# Patient Record
Sex: FEMALE | Race: WHITE
[De-identification: names, ages, dates, MRNs, and addresses within clinical notes are randomized per-mention and may not be internally consistent; named-entity substitution may affect disease eponyms.]

---

## 2019-02-06 ENCOUNTER — HOSPITAL ENCOUNTER (EMERGENCY)
Dept: HOSPITAL 91 - E/R | Age: 64
LOS: 1 days | Discharge: HOME | End: 2019-02-07
Payer: MEDICARE

## 2019-02-06 DIAGNOSIS — E11.9: ICD-10-CM

## 2019-02-06 DIAGNOSIS — Z79.84: ICD-10-CM

## 2019-02-06 DIAGNOSIS — I10: ICD-10-CM

## 2019-02-06 DIAGNOSIS — R10.9: Primary | ICD-10-CM

## 2019-02-06 DIAGNOSIS — F17.210: ICD-10-CM

## 2019-02-06 LAB
ADD MAN DIFF?: NO
ALANINE AMINOTRANSFERASE: 18 IU/L (ref 13–69)
ALBUMIN/GLOBULIN RATIO: 1.55
ALBUMIN: 4.5 G/DL (ref 3.3–4.9)
ALKALINE PHOSPHATASE: 70 IU/L (ref 42–121)
ANION GAP: 11 (ref 5–13)
ASPARTATE AMINO TRANSFERASE: 20 IU/L (ref 15–46)
BASOPHIL #: 0.1 10^3/UL (ref 0–0.1)
BASOPHILS %: 0.8 % (ref 0–2)
BILIRUBIN,DIRECT: 0 MG/DL (ref 0–0.2)
BILIRUBIN,TOTAL: 0.3 MG/DL (ref 0.2–1.3)
BLOOD UREA NITROGEN: 29 MG/DL (ref 7–20)
CALCIUM: 10.6 MG/DL (ref 8.4–10.2)
CARBON DIOXIDE: 26 MMOL/L (ref 21–31)
CHLORIDE: 102 MMOL/L (ref 97–110)
CREATININE: 0.59 MG/DL (ref 0.44–1)
EOSINOPHILS #: 0.2 10^3/UL (ref 0–0.5)
EOSINOPHILS %: 1.6 % (ref 0–7)
GLOBULIN: 2.9 G/DL (ref 1.3–3.2)
GLUCOSE: 108 MG/DL (ref 70–220)
HEMATOCRIT: 45.9 % (ref 37–47)
HEMOGLOBIN: 15.7 G/DL (ref 12–16)
IMMATURE GRANS #M: 0.05 10^3/UL (ref 0–0.03)
IMMATURE GRANS % (M): 0.5 % (ref 0–0.43)
LIPASE: 35 U/L (ref 23–300)
LYMPHOCYTES #: 2.6 10^3/UL (ref 0.8–2.9)
LYMPHOCYTES %: 27.4 % (ref 15–51)
MEAN CORPUSCULAR HEMOGLOBIN: 32.2 PG (ref 29–33)
MEAN CORPUSCULAR HGB CONC: 34.2 G/DL (ref 32–37)
MEAN CORPUSCULAR VOLUME: 94.3 FL (ref 82–101)
MEAN PLATELET VOLUME: 10.7 FL (ref 7.4–10.4)
MONOCYTE #: 0.8 10^3/UL (ref 0.3–0.9)
MONOCYTES %: 8.4 % (ref 0–11)
NEUTROPHIL #: 5.8 10^3/UL (ref 1.6–7.5)
NEUTROPHILS %: 61.3 % (ref 39–77)
NUCLEATED RED BLOOD CELLS #: 0 10^3/UL (ref 0–0)
NUCLEATED RED BLOOD CELLS%: 0 /100WBC (ref 0–0)
PLATELET COUNT: 203 10^3/UL (ref 140–415)
POTASSIUM: 3.7 MMOL/L (ref 3.5–5.1)
RED BLOOD COUNT: 4.87 10^6/UL (ref 4.2–5.4)
RED CELL DISTRIBUTION WIDTH: 12.3 % (ref 11.5–14.5)
SODIUM: 139 MMOL/L (ref 135–144)
TOTAL PROTEIN: 7.4 G/DL (ref 6.1–8.1)
WHITE BLOOD COUNT: 9.5 10^3/UL (ref 4.8–10.8)

## 2019-02-06 PROCEDURE — 96372 THER/PROPH/DIAG INJ SC/IM: CPT

## 2019-02-06 PROCEDURE — 99285 EMERGENCY DEPT VISIT HI MDM: CPT

## 2019-02-06 PROCEDURE — 83690 ASSAY OF LIPASE: CPT

## 2019-02-06 PROCEDURE — 36415 COLL VENOUS BLD VENIPUNCTURE: CPT

## 2019-02-06 PROCEDURE — 85025 COMPLETE CBC W/AUTO DIFF WBC: CPT

## 2019-02-06 PROCEDURE — 81001 URINALYSIS AUTO W/SCOPE: CPT

## 2019-02-06 PROCEDURE — 80053 COMPREHEN METABOLIC PANEL: CPT

## 2019-02-06 PROCEDURE — 74177 CT ABD & PELVIS W/CONTRAST: CPT

## 2019-02-06 RX ADMIN — IOHEXOL 1 ML: 300 INJECTION, SOLUTION INTRAVENOUS at 23:38

## 2019-02-06 RX ADMIN — VASOPRESSIN 1 ML/SEC: 20 INJECTION, SOLUTION INTRAMUSCULAR; SUBCUTANEOUS at 23:38

## 2019-02-07 LAB
ADD UMIC: YES
UR ASCORBIC ACID: NEGATIVE MG/DL
UR BACTERIA: (no result) /HPF
UR BILIRUBIN (DIP): NEGATIVE MG/DL
UR BLOOD (DIP): (no result) MG/DL
UR CLARITY: CLEAR
UR COLOR: YELLOW
UR GLUCOSE (DIP): NEGATIVE MG/DL
UR KETONES (DIP): NEGATIVE MG/DL
UR LEUKOCYTE ESTERASE (DIP): NEGATIVE LEU/UL
UR NITRITE (DIP): NEGATIVE MG/DL
UR PH (DIP): 5 (ref 5–9)
UR RBC: 6 /HPF (ref 0–5)
UR SPECIFIC GRAVITY (DIP): 1.01 (ref 1–1.03)
UR TOTAL PROTEIN (DIP): NEGATIVE MG/DL
UR UROBILINOGEN (DIP): NEGATIVE MG/DL
UR WBC: 8 /HPF (ref 0–5)

## 2019-02-07 RX ADMIN — METHYLNALTREXONE BROMIDE 1 MG: 12 INJECTION, SOLUTION SUBCUTANEOUS at 01:43

## 2019-07-12 ENCOUNTER — HOSPITAL ENCOUNTER (INPATIENT)
Dept: HOSPITAL 10 - FTE | Age: 64
LOS: 7 days | Discharge: HOME HEALTH | DRG: 202 | End: 2019-07-19
Payer: MEDICARE

## 2019-07-12 VITALS
BODY MASS INDEX: 32.77 KG/M2 | WEIGHT: 184.97 LBS | HEIGHT: 63 IN | HEIGHT: 63 IN | WEIGHT: 184.97 LBS | BODY MASS INDEX: 32.77 KG/M2

## 2019-07-12 VITALS — DIASTOLIC BLOOD PRESSURE: 72 MMHG | SYSTOLIC BLOOD PRESSURE: 148 MMHG | RESPIRATION RATE: 16 BRPM | HEART RATE: 97 BPM

## 2019-07-12 DIAGNOSIS — N32.81: ICD-10-CM

## 2019-07-12 DIAGNOSIS — F41.9: ICD-10-CM

## 2019-07-12 DIAGNOSIS — J44.0: ICD-10-CM

## 2019-07-12 DIAGNOSIS — K43.9: ICD-10-CM

## 2019-07-12 DIAGNOSIS — E66.9: ICD-10-CM

## 2019-07-12 DIAGNOSIS — F32.9: ICD-10-CM

## 2019-07-12 DIAGNOSIS — J96.11: ICD-10-CM

## 2019-07-12 DIAGNOSIS — Z79.84: ICD-10-CM

## 2019-07-12 DIAGNOSIS — E11.40: ICD-10-CM

## 2019-07-12 DIAGNOSIS — J20.9: Primary | ICD-10-CM

## 2019-07-12 DIAGNOSIS — G89.29: ICD-10-CM

## 2019-07-12 DIAGNOSIS — J44.1: ICD-10-CM

## 2019-07-12 DIAGNOSIS — M54.9: ICD-10-CM

## 2019-07-12 DIAGNOSIS — I10: ICD-10-CM

## 2019-07-12 DIAGNOSIS — K59.00: ICD-10-CM

## 2019-07-12 DIAGNOSIS — F17.210: ICD-10-CM

## 2019-07-12 DIAGNOSIS — F17.200: ICD-10-CM

## 2019-07-12 PROCEDURE — 36600 WITHDRAWAL OF ARTERIAL BLOOD: CPT

## 2019-07-12 PROCEDURE — 83605 ASSAY OF LACTIC ACID: CPT

## 2019-07-12 PROCEDURE — 82962 GLUCOSE BLOOD TEST: CPT

## 2019-07-12 PROCEDURE — 82803 BLOOD GASES ANY COMBINATION: CPT

## 2019-07-12 PROCEDURE — 80061 LIPID PANEL: CPT

## 2019-07-12 PROCEDURE — 85025 COMPLETE CBC W/AUTO DIFF WBC: CPT

## 2019-07-12 PROCEDURE — 84443 ASSAY THYROID STIM HORMONE: CPT

## 2019-07-12 PROCEDURE — 80053 COMPREHEN METABOLIC PANEL: CPT

## 2019-07-12 PROCEDURE — 71045 X-RAY EXAM CHEST 1 VIEW: CPT

## 2019-07-12 PROCEDURE — 84436 ASSAY OF TOTAL THYROXINE: CPT

## 2019-07-12 PROCEDURE — 94664 DEMO&/EVAL PT USE INHALER: CPT

## 2019-07-12 PROCEDURE — 84479 ASSAY OF THYROID (T3 OR T4): CPT

## 2019-07-12 PROCEDURE — 87086 URINE CULTURE/COLONY COUNT: CPT

## 2019-07-12 PROCEDURE — 84484 ASSAY OF TROPONIN QUANT: CPT

## 2019-07-12 PROCEDURE — 81003 URINALYSIS AUTO W/O SCOPE: CPT

## 2019-07-12 PROCEDURE — 83036 HEMOGLOBIN GLYCOSYLATED A1C: CPT

## 2019-07-12 PROCEDURE — 94640 AIRWAY INHALATION TREATMENT: CPT

## 2019-07-12 RX ADMIN — IPRATROPIUM BROMIDE AND ALBUTEROL SULFATE SCH ML: .5; 3 SOLUTION RESPIRATORY (INHALATION) at 21:52
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
## 2019-07-13 VITALS — RESPIRATION RATE: 18 BRPM | HEART RATE: 77 BPM | DIASTOLIC BLOOD PRESSURE: 78 MMHG | SYSTOLIC BLOOD PRESSURE: 129 MMHG

## 2019-07-13 VITALS — HEART RATE: 88 BPM | RESPIRATION RATE: 18 BRPM | DIASTOLIC BLOOD PRESSURE: 63 MMHG | SYSTOLIC BLOOD PRESSURE: 134 MMHG

## 2019-07-13 VITALS — RESPIRATION RATE: 18 BRPM | HEART RATE: 94 BPM | SYSTOLIC BLOOD PRESSURE: 130 MMHG | DIASTOLIC BLOOD PRESSURE: 63 MMHG

## 2019-07-13 VITALS — RESPIRATION RATE: 18 BRPM | DIASTOLIC BLOOD PRESSURE: 68 MMHG | HEART RATE: 77 BPM | SYSTOLIC BLOOD PRESSURE: 143 MMHG

## 2019-07-13 VITALS — RESPIRATION RATE: 18 BRPM | HEART RATE: 84 BPM | DIASTOLIC BLOOD PRESSURE: 60 MMHG | SYSTOLIC BLOOD PRESSURE: 129 MMHG

## 2019-07-13 RX ADMIN — IPRATROPIUM BROMIDE AND ALBUTEROL SULFATE SCH ML: .5; 3 SOLUTION RESPIRATORY (INHALATION) at 08:14

## 2019-07-13 RX ADMIN — DOCUSATE SODIUM SCH MG: 100 CAPSULE, LIQUID FILLED ORAL at 08:30

## 2019-07-13 RX ADMIN — LEVOFLOXACIN SCH MG: 750 TABLET, FILM COATED ORAL at 05:46

## 2019-07-13 RX ADMIN — OXYCODONE HYDROCHLORIDE SCH MG: 40 TABLET, FILM COATED, EXTENDED RELEASE ORAL at 08:30

## 2019-07-13 RX ADMIN — GABAPENTIN SCH MG: 300 CAPSULE ORAL at 21:39

## 2019-07-13 RX ADMIN — IPRATROPIUM BROMIDE AND ALBUTEROL SULFATE SCH ML: .5; 3 SOLUTION RESPIRATORY (INHALATION) at 05:00

## 2019-07-13 RX ADMIN — ISOSORBIDE DINITRATE SCH MG: 10 TABLET ORAL at 21:39

## 2019-07-13 RX ADMIN — INSULIN HUMAN SCH UNIT: 100 INJECTION, SUSPENSION SUBCUTANEOUS at 21:44

## 2019-07-13 RX ADMIN — PANTOPRAZOLE SODIUM SCH MG: 40 TABLET, DELAYED RELEASE ORAL at 05:46

## 2019-07-13 RX ADMIN — NICOTINE SCH PATCH: 14 PATCH, EXTENDED RELEASE TRANSDERMAL at 08:49

## 2019-07-13 RX ADMIN — OXYBUTYNIN CHLORIDE SCH MG: 5 TABLET, EXTENDED RELEASE ORAL at 08:29

## 2019-07-13 RX ADMIN — OXYCODONE HYDROCHLORIDE SCH MG: 40 TABLET, FILM COATED, EXTENDED RELEASE ORAL at 05:45

## 2019-07-13 RX ADMIN — ISOSORBIDE DINITRATE SCH MG: 10 TABLET ORAL at 08:31

## 2019-07-13 RX ADMIN — IPRATROPIUM BROMIDE AND ALBUTEROL SULFATE SCH ML: .5; 3 SOLUTION RESPIRATORY (INHALATION) at 11:58

## 2019-07-13 RX ADMIN — ISOSORBIDE DINITRATE SCH MG: 10 TABLET ORAL at 12:51

## 2019-07-13 RX ADMIN — BUPROPION HYDROCHLORIDE SCH MG: 150 TABLET, EXTENDED RELEASE ORAL at 08:30

## 2019-07-13 RX ADMIN — DOCUSATE SODIUM SCH MG: 100 CAPSULE, LIQUID FILLED ORAL at 21:39

## 2019-07-13 RX ADMIN — ZOLPIDEM TARTRATE PRN MG: 5 TABLET, FILM COATED ORAL at 00:25

## 2019-07-13 RX ADMIN — INSULIN HUMAN SCH UNIT: 100 INJECTION, SUSPENSION SUBCUTANEOUS at 16:26

## 2019-07-13 RX ADMIN — GABAPENTIN SCH MG: 300 CAPSULE ORAL at 12:47

## 2019-07-13 RX ADMIN — BUPROPION HYDROCHLORIDE SCH MG: 150 TABLET, EXTENDED RELEASE ORAL at 21:39

## 2019-07-13 RX ADMIN — CARISOPRODOL SCH MG: 350 TABLET ORAL at 08:30

## 2019-07-13 RX ADMIN — CARISOPRODOL SCH MG: 350 TABLET ORAL at 21:39

## 2019-07-13 RX ADMIN — DOCUSATE SODIUM SCH MG: 100 CAPSULE, LIQUID FILLED ORAL at 13:04

## 2019-07-13 RX ADMIN — ARFORMOTEROL TARTRATE SCH ML: 15 SOLUTION RESPIRATORY (INHALATION) at 19:49

## 2019-07-13 RX ADMIN — NICOTINE SCH PATCH: 14 PATCH, EXTENDED RELEASE TRANSDERMAL at 08:32

## 2019-07-13 RX ADMIN — MORPHINE SULFATE PRN MG: 2 INJECTION, SOLUTION INTRAMUSCULAR; INTRAVENOUS at 13:04

## 2019-07-13 RX ADMIN — ENOXAPARIN SODIUM SCH MG: 100 INJECTION SUBCUTANEOUS at 08:35

## 2019-07-13 RX ADMIN — IPRATROPIUM BROMIDE AND ALBUTEROL SULFATE SCH ML: .5; 3 SOLUTION RESPIRATORY (INHALATION) at 19:49

## 2019-07-13 RX ADMIN — GABAPENTIN SCH MG: 300 CAPSULE ORAL at 08:29

## 2019-07-13 RX ADMIN — ISOSORBIDE DINITRATE SCH MG: 10 TABLET ORAL at 12:47

## 2019-07-13 RX ADMIN — POLYETHYLENE GLYCOL 3350 SCH GM: 17 POWDER, FOR SOLUTION ORAL at 08:29

## 2019-07-13 RX ADMIN — BACLOFEN SCH MG: 10 TABLET ORAL at 08:29

## 2019-07-13 RX ADMIN — BUDESONIDE SCH MG: 0.5 SUSPENSION RESPIRATORY (INHALATION) at 19:49

## 2019-07-13 RX ADMIN — IPRATROPIUM BROMIDE AND ALBUTEROL SULFATE SCH ML: .5; 3 SOLUTION RESPIRATORY (INHALATION) at 16:32

## 2019-07-13 RX ADMIN — IPRATROPIUM BROMIDE AND ALBUTEROL SULFATE SCH ML: .5; 3 SOLUTION RESPIRATORY (INHALATION) at 01:05

## 2019-07-13 NOTE — PN
Date/Time of Note


Date/Time of Note


DATE: 7/13/19 


TIME: 12:26





Assessment/Plan


VTE Prophylaxis


Risk score (from Nsg)>0 risk:  3


SCD applied (from Nsg):  Yes


Pharmacological prophylaxis:  LMWH





Lines/Catheters


IV Catheter Type (from Nrsg):  Saline Lock





Assessment/Plan


Hospital Course


SUBJECTIVE: She still with wheezing.  Having mild shortness of breath.  On 3 L 


oxygen via nasal cannula.





OBJECTIVE: Vital signs-see below








PHYSICAL EXAM:


Constitutional: Adequately built,not in acute distress.


HEENT: Head atraumatic and normocephalic. Eyes: Extraocular muscles  intact. 


Anicteric sclerae. Pupils equal bilaterally, reactive to light.


NECK: Supple without lymph node.


CHEST: Expiratory wheezing bilaterally. 


HEART: S1, S2. Regular rate and rhythm.


ABDOMEN: Soft/non tender with no rebound tenderness.  Bowel sounds were present.


EXTREMITIES:  No cyanosis, clubbing or edema.


NEUROLOGIC: Alert and oriented x3. No focal deficit. No sensory deficit.


PSYCHOSOCIAL: No signs of depression.


INTEGUMENTARY: No open wounds.





ASSESSMENT AND PLAN:63 yo w/copd,current every day smoker,dm neuropathy,anxiety,


chronic back pain, here w/cough/sob...





COPD exacerbation


-Continue around-the-clock Annie, Brovana nebulization


-Increase Solu-Medrol to 40 mg q. 8


-Supplemental oxygen





Tracheobronchitis


-Continue Levaquin.  Add cough medications





DM2


-With IV steroids, will start patient on NPH to be linked with steroid dose.


-Continue basal/bolus regimen.





Anxiety disorders


-Continue home medications





Overactive bladder


-Continue home medications





Tobacco abuse


-Counseled on cessation.  Provide nicotine patch





Chronic Back pain


-pain control





Obesity with a BMI 32.8


-Weight reduction advised





DVT prophylaxis: Lovenox





Disposition: Continue current management.  Await for clinical improvement.  


Assess for home oxygen requirement.





Patient was seen in collaboration with Dr. Marquez.


Result Diagram:  


7/12/19 1800                                                                    


           7/12/19 1800





Results 24hrs





Laboratory Tests


Test
                 7/12/19
18:00  7/12/19
20:14  7/13/19
04:53  7/13/19
08:21


White Blood Count            10.4


Red Blood Count              4.79


Hemoglobin                   15.3


Hematocrit                   44.9


Mean Corpuscular             93.7


Volume


Mean Corpuscular             31.9


Hemoglobin


Mean Corpuscular            34.4  
  
              
              



Hemoglobin
Concent


Red Cell                     12.2


Distribution Width


Platelet Count               263  #


Mean Platelet Volume          9.1


Neutrophils %                67.4


Lymphocytes %                21.1


Monocytes %                   8.9


Eosinophils %                 0.5


Basophils %                   0.6


Nucleated Red Blood           0.0


Cells %


Neutrophils #                 7.0


Lymphocytes #                 2.2


Monocytes #                   0.9


Eosinophils #                 0.1


Basophils #                   0.1


Nucleated Red Blood           0.0


Cells #


Sodium Level                  140


Potassium Level              3.4  L


Chloride Level                100


Carbon Dioxide Level           30


Anion Gap                      10


Blood Urea Nitrogen            18


Creatinine                   0.87


Est Glomerular        > 60  
        
              
              



Filtrat Rate
mL/min


Glucose Level                 156


Calcium Level                 9.8


Total Bilirubin               0.4


Direct Bilirubin             0.00


Indirect Bilirubin            0.4


Aspartate Amino               28  
  
              
              



Transf
(AST/SGOT)


Alanine                       25  
  
              
              



Aminotransferase
(AL


T/SGPT)


Alkaline Phosphatase           86


Troponin I            < 0.012


Total Protein                 8.0


Albumin                       4.4


Globulin                    3.60  H


Albumin/Globulin             1.22


Ratio


POC Venous Lactate                           1.3


Hemoglobin A1c                                              5.4


Triglycerides Level                                          62


Cholesterol Level                                           141


LDL Cholesterol,                                             86


Calculated


HDL Cholesterol                                              43


Cholesterol/HDL                                             3.2


Ratio


Thyroid Stimulating   
              
                  0.119  L
  



Hormone
(TSH)


Free Thyroxine Index                                       3.51


Thyroxine (T4)                                              8.6


Triiodothyronine                                          40.8  H


(T3) Uptake


Bedside Glucose                                                            199








Exam/Review of Systems


Exam


Vitals





Vital Signs


  Date      Temp  Pulse  Resp  B/P (MAP)   Pulse Ox  O2          O2 Flow    FiO2


Time                                                 Delivery    Rate


   7/13/19           78    20                    95  Nasal             3.0


     11:59                                           Cannula


   7/13/19  98.3                   143/68


     08:16                           (93)


   7/12/19                                                                    21


     18:07








Results


Results 24hrs





Laboratory Tests


Test
                 7/12/19
18:00  7/12/19
20:14  7/13/19
04:53  7/13/19
08:21


White Blood Count            10.4


Red Blood Count              4.79


Hemoglobin                   15.3


Hematocrit                   44.9


Mean Corpuscular             93.7


Volume


Mean Corpuscular             31.9


Hemoglobin


Mean Corpuscular            34.4  
  
              
              



Hemoglobin
Concent


Red Cell                     12.2


Distribution Width


Platelet Count               263  #


Mean Platelet Volume          9.1


Neutrophils %                67.4


Lymphocytes %                21.1


Monocytes %                   8.9


Eosinophils %                 0.5


Basophils %                   0.6


Nucleated Red Blood           0.0


Cells %


Neutrophils #                 7.0


Lymphocytes #                 2.2


Monocytes #                   0.9


Eosinophils #                 0.1


Basophils #                   0.1


Nucleated Red Blood           0.0


Cells #


Sodium Level                  140


Potassium Level              3.4  L


Chloride Level                100


Carbon Dioxide Level           30


Anion Gap                      10


Blood Urea Nitrogen            18


Creatinine                   0.87


Est Glomerular        > 60  
        
              
              



Filtrat Rate
mL/min


Glucose Level                 156


Calcium Level                 9.8


Total Bilirubin               0.4


Direct Bilirubin             0.00


Indirect Bilirubin            0.4


Aspartate Amino               28  
  
              
              



Transf
(AST/SGOT)


Alanine                       25  
  
              
              



Aminotransferase
(AL


T/SGPT)


Alkaline Phosphatase           86


Troponin I            < 0.012


Total Protein                 8.0


Albumin                       4.4


Globulin                    3.60  H


Albumin/Globulin             1.22


Ratio


POC Venous Lactate                           1.3


Hemoglobin A1c                                              5.4


Triglycerides Level                                          62


Cholesterol Level                                           141


LDL Cholesterol,                                             86


Calculated


HDL Cholesterol                                              43


Cholesterol/HDL                                             3.2


Ratio


Thyroid Stimulating   
              
                  0.119  L
  



Hormone
(TSH)


Free Thyroxine Index                                       3.51


Thyroxine (T4)                                              8.6


Triiodothyronine                                          40.8  H


(T3) Uptake


Bedside Glucose                                                            199








Medications


Medication





Current Medications


Ondansetron HCl (Zofran Inj) 4 mg BRIDGE ORDER PRN IV NAUSEA/VOMITING;  Start 


7/12/19 at 20:00;  Stop 7/13/19 at 19:59


Acetaminophen (Tylenol Tab) 650 mg ER BRIDGE PRN PO .MILD PAIN 1-3 OR TEMP;  


Start 7/12/19 at 20:00;  Stop 7/13/19 at 19:59


IV Flush (NS 3 ml) 3 ml PER PROTOCOL IV ;  Start 7/12/19 at 20:30


Ondansetron HCl (Zofran Inj) 4 mg Q6H  PRN IV NAUSEA/VOMITING;  Start 7/12/19 at


20:30


Acetaminophen (Tylenol Tab) 650 mg Q6H  PRN PO .PAIN 1-3 OR TEMP Last 


administered on 7/13/19at 00:25; Admin Dose 650 MG;  Start 7/12/19 at 20:30


Docusate Sodium (Colace) 100 mg Q12H  PRN PO .CONSTIPATION;  Start 7/12/19 at 


20:30


Bisacodyl (Dulcolax) 5 mg DAILY  PRN PO .CONSTIPATION;  Start 7/12/19 at 20:30


Enoxaparin Sodium (Lovenox) 40 mg DAILY SC  Last administered on 7/13/19at 


08:35; Admin Dose 40 MG;  Start 7/13/19 at 09:00


Albuterol/ Ipratropium (Duoneb) 3 ml Q4H RESP  THERAPY HHN  Last administered on


7/13/19at 11:58; Admin Dose 3 ML;  Start 7/12/19 at 21:00


Methylprednisolone Sodium Succinate (Solu-Medrol) 30 mg Q8 IV  Last administered


on 7/13/19at 05:45; Admin Dose 30 MG;  Start 7/12/19 at 22:00


Levofloxacin (Levaquin) 750 mg DAILY@06 PO  Last administered on 7/13/19at 


05:46; Admin Dose 750 MG;  Start 7/13/19 at 06:00;  Stop 7/18/19 at 05:59


Gabapentin (Neurontin) 300 mg TID PO  Last administered on 7/13/19at 08:29; 


Admin Dose 300 MG;  Start 7/13/19 at 09:00


Nicotine (Nicoderm 14 Mg/ 24hr) 1 patch DAILY TRANSDERM  Last administered on 


7/13/19at 08:49; Admin Dose 1 PATCH;  Start 7/13/19 at 09:00


Albuterol (Proventil 0.083% (Neb)) 2.5 mg Q2H RESP THERAPY  PRN HHN SHORTNESS OF


BREATH;  Start 7/12/19 at 23:30


Diagnostic Test (Pha) (Accu-Chek) 1 ea 02 XX ;  Start 7/13/19 at 02:00


Insulin Aspart (Novolog Insulin Pen) NOVOLOG *MILD* ALGORITHM WITH MEALS  


BEDTIME SC  Last administered on 7/13/19at 08:48; Admin Dose 2 UNIT;  Start 


7/13/19 at 08:00


Miscellaneous Information 1 ea NOTE XX ;  Start 7/12/19 at 23:30


Glucose (Glutose) 15 gm Q15M  PRN PO DECREASED GLUCOSE;  Start 7/12/19 at 23:30


Glucose (Glutose) 22.5 gm Q15M  PRN PO DECREASED GLUCOSE;  Start 7/12/19 at 


23:30


Dextrose (D50w Syringe) 25 ml Q15M  PRN IV DECREASED GLUCOSE;  Start 7/12/19 at 


23:30


Dextrose (D50w Syringe) 50 ml Q15M  PRN IV DECREASED GLUCOSE;  Start 7/12/19 at 


23:30


Glucagon (Glucagen) 1 mg Q15M  PRN IM DECREASED GLUCOSE;  Start 7/12/19 at 23:30


Glucose (Glutose) 15 gm Q15M  PRN BUCCAL DECREASED GLUCOSE;  Start 7/12/19 at 


23:30


Zolpidem Tartrate (Ambien) 5 mg HS  PRN PO INSOMNIA Last administered on 


7/13/19at 00:25; Admin Dose 5 MG;  Start 7/12/19 at 23:30


Baclofen (Lioresal) 10 mg DAILY PO  Last administered on 7/13/19at 08:29; Admin 


Dose 10 MG;  Start 7/13/19 at 09:00


Bupropion HCl (Wellbutrin Sr) 150 mg BID PO  Last administered on 7/13/19at 


08:30; Admin Dose 150 MG;  Start 7/13/19 at 09:00


Carisoprodol (Soma) 350 mg Q12 PO  Last administered on 7/13/19at 08:30; Admin 


Dose 350 MG;  Start 7/13/19 at 09:00


Docusate Sodium (Colace) 100 mg TID PO  Last administered on 7/13/19at 08:30; 


Admin Dose 100 MG;  Start 7/13/19 at 09:00


Isosorbide Dinitrate (Isordil) 10 mg TID PO  Last administered on 7/13/19at 


08:31; Admin Dose 10 MG;  Start 7/13/19 at 09:00


Oxybutynin Chloride (Ditropan Xl) 10 mg DAILY PO  Last administered on 7/13/19at


08:29; Admin Dose 10 MG;  Start 7/13/19 at 09:00


Oxycodone HCl (Oxycontin) 40 mg Q8 PO  Last administered on 7/13/19at 08:30; 


Admin Dose 40 MG;  Start 7/13/19 at 06:00


Phenazopyridine HCl (Pyridium) 100 mg TID  PRN PO dysuria;  Start 7/13/19 at 


04:30


Polyethylene Glycol (Miralax) 17 gm DAILY PO  Last administered on 7/13/19at 


08:29; Admin Dose 17 GM;  Start 7/13/19 at 09:00


Tramadol HCl (Ultram) 100 mg Q6H  PRN PO PAIN LEVEL 1-3;  Start 7/13/19 at 04:30


Pantoprazole (Protonix Tab) 40 mg DAILY@06 PO  Last administered on 7/13/19at 


05:46; Admin Dose 40 MG;  Start 7/13/19 at 06:00


Arformoterol Tartrate (Brovana (Neb)) 2 ml BID RESP  THERAPY INH ;  Start 


7/13/19 at 20:00


Budesonide (Pulmicort (Neb)) 0.5 mg BID RESP  THERAPY INH ;  Start 7/13/19 at 


20:00











LETICIA MOFFETT NP               Jul 13, 2019 12:27

## 2019-07-14 VITALS — HEART RATE: 84 BPM | SYSTOLIC BLOOD PRESSURE: 131 MMHG | DIASTOLIC BLOOD PRESSURE: 64 MMHG | RESPIRATION RATE: 16 BRPM

## 2019-07-14 VITALS — DIASTOLIC BLOOD PRESSURE: 78 MMHG | RESPIRATION RATE: 18 BRPM | SYSTOLIC BLOOD PRESSURE: 103 MMHG | HEART RATE: 80 BPM

## 2019-07-14 VITALS — HEART RATE: 81 BPM | SYSTOLIC BLOOD PRESSURE: 129 MMHG | RESPIRATION RATE: 19 BRPM | DIASTOLIC BLOOD PRESSURE: 64 MMHG

## 2019-07-14 VITALS — SYSTOLIC BLOOD PRESSURE: 142 MMHG | RESPIRATION RATE: 20 BRPM | DIASTOLIC BLOOD PRESSURE: 66 MMHG | HEART RATE: 67 BPM

## 2019-07-14 RX ADMIN — GABAPENTIN SCH MG: 300 CAPSULE ORAL at 21:01

## 2019-07-14 RX ADMIN — IPRATROPIUM BROMIDE AND ALBUTEROL SULFATE SCH ML: .5; 3 SOLUTION RESPIRATORY (INHALATION) at 04:41

## 2019-07-14 RX ADMIN — PANTOPRAZOLE SODIUM SCH MG: 40 TABLET, DELAYED RELEASE ORAL at 05:52

## 2019-07-14 RX ADMIN — ISOSORBIDE DINITRATE SCH MG: 10 TABLET ORAL at 09:52

## 2019-07-14 RX ADMIN — POLYETHYLENE GLYCOL 3350 SCH GM: 17 POWDER, FOR SOLUTION ORAL at 09:51

## 2019-07-14 RX ADMIN — DOCUSATE SODIUM SCH MG: 100 CAPSULE, LIQUID FILLED ORAL at 21:04

## 2019-07-14 RX ADMIN — INSULIN HUMAN SCH UNIT: 100 INJECTION, SUSPENSION SUBCUTANEOUS at 14:16

## 2019-07-14 RX ADMIN — ISOSORBIDE DINITRATE SCH MG: 10 TABLET ORAL at 21:02

## 2019-07-14 RX ADMIN — BACLOFEN SCH MG: 10 TABLET ORAL at 09:51

## 2019-07-14 RX ADMIN — BUPROPION HYDROCHLORIDE SCH MG: 150 TABLET, EXTENDED RELEASE ORAL at 09:51

## 2019-07-14 RX ADMIN — CARISOPRODOL SCH MG: 350 TABLET ORAL at 09:51

## 2019-07-14 RX ADMIN — IPRATROPIUM BROMIDE AND ALBUTEROL SULFATE SCH ML: .5; 3 SOLUTION RESPIRATORY (INHALATION) at 10:08

## 2019-07-14 RX ADMIN — NICOTINE SCH PATCH: 14 PATCH, EXTENDED RELEASE TRANSDERMAL at 09:53

## 2019-07-14 RX ADMIN — ARFORMOTEROL TARTRATE SCH ML: 15 SOLUTION RESPIRATORY (INHALATION) at 07:47

## 2019-07-14 RX ADMIN — BUDESONIDE SCH MG: 0.5 SUSPENSION RESPIRATORY (INHALATION) at 07:55

## 2019-07-14 RX ADMIN — PHENAZOPYRIDINE HYDROCHLORIDE SCH MG: 100 TABLET ORAL at 14:17

## 2019-07-14 RX ADMIN — LEVOFLOXACIN SCH MG: 750 TABLET, FILM COATED ORAL at 05:52

## 2019-07-14 RX ADMIN — MORPHINE SULFATE PRN MG: 2 INJECTION, SOLUTION INTRAMUSCULAR; INTRAVENOUS at 11:11

## 2019-07-14 RX ADMIN — OXYBUTYNIN CHLORIDE SCH MG: 5 TABLET, EXTENDED RELEASE ORAL at 09:51

## 2019-07-14 RX ADMIN — GUAIFENESIN AND DEXTROMETHORPHAN HYDROBROMIDE SCH TAB: 600; 30 TABLET, EXTENDED RELEASE ORAL at 14:44

## 2019-07-14 RX ADMIN — GABAPENTIN SCH MG: 300 CAPSULE ORAL at 12:09

## 2019-07-14 RX ADMIN — AZITHROMYCIN SCH MG: 500 TABLET, FILM COATED ORAL at 14:44

## 2019-07-14 RX ADMIN — DOCUSATE SODIUM SCH MG: 100 CAPSULE, LIQUID FILLED ORAL at 09:51

## 2019-07-14 RX ADMIN — ZOLPIDEM TARTRATE PRN MG: 5 TABLET, FILM COATED ORAL at 23:50

## 2019-07-14 RX ADMIN — BUPROPION HYDROCHLORIDE SCH MG: 150 TABLET, EXTENDED RELEASE ORAL at 21:02

## 2019-07-14 RX ADMIN — AMOXICILLIN AND CLAVULANATE POTASSIUM SCH MG: 875; 125 TABLET, FILM COATED ORAL at 14:43

## 2019-07-14 RX ADMIN — MORPHINE SULFATE PRN MG: 2 INJECTION, SOLUTION INTRAMUSCULAR; INTRAVENOUS at 17:55

## 2019-07-14 RX ADMIN — ENOXAPARIN SODIUM SCH MG: 100 INJECTION SUBCUTANEOUS at 09:56

## 2019-07-14 RX ADMIN — CARISOPRODOL SCH MG: 350 TABLET ORAL at 21:01

## 2019-07-14 RX ADMIN — IPRATROPIUM BROMIDE AND ALBUTEROL SULFATE SCH ML: .5; 3 SOLUTION RESPIRATORY (INHALATION) at 14:02

## 2019-07-14 RX ADMIN — DOCUSATE SODIUM SCH MG: 100 CAPSULE, LIQUID FILLED ORAL at 12:09

## 2019-07-14 RX ADMIN — IPRATROPIUM BROMIDE AND ALBUTEROL SULFATE SCH ML: .5; 3 SOLUTION RESPIRATORY (INHALATION) at 17:24

## 2019-07-14 RX ADMIN — ISOSORBIDE DINITRATE SCH MG: 10 TABLET ORAL at 12:09

## 2019-07-14 RX ADMIN — GUAIFENESIN AND DEXTROMETHORPHAN HYDROBROMIDE SCH TAB: 600; 30 TABLET, EXTENDED RELEASE ORAL at 21:03

## 2019-07-14 RX ADMIN — BUDESONIDE SCH MG: 0.5 SUSPENSION RESPIRATORY (INHALATION) at 20:26

## 2019-07-14 RX ADMIN — IPRATROPIUM BROMIDE AND ALBUTEROL SULFATE SCH ML: .5; 3 SOLUTION RESPIRATORY (INHALATION) at 20:26

## 2019-07-14 RX ADMIN — AMOXICILLIN AND CLAVULANATE POTASSIUM SCH MG: 875; 125 TABLET, FILM COATED ORAL at 21:03

## 2019-07-14 RX ADMIN — INSULIN HUMAN SCH UNIT: 100 INJECTION, SUSPENSION SUBCUTANEOUS at 05:52

## 2019-07-14 RX ADMIN — PHENAZOPYRIDINE HYDROCHLORIDE SCH MG: 100 TABLET ORAL at 21:03

## 2019-07-14 RX ADMIN — INSULIN HUMAN SCH UNIT: 100 INJECTION, SUSPENSION SUBCUTANEOUS at 21:12

## 2019-07-14 RX ADMIN — ARFORMOTEROL TARTRATE SCH ML: 15 SOLUTION RESPIRATORY (INHALATION) at 20:26

## 2019-07-14 RX ADMIN — GABAPENTIN SCH MG: 300 CAPSULE ORAL at 09:50

## 2019-07-14 RX ADMIN — ALPRAZOLAM PRN MG: 0.25 TABLET ORAL at 14:17

## 2019-07-14 RX ADMIN — IPRATROPIUM BROMIDE AND ALBUTEROL SULFATE SCH ML: .5; 3 SOLUTION RESPIRATORY (INHALATION) at 00:42

## 2019-07-14 NOTE — PN
Date/Time of Note


Date/Time of Note


DATE: 7/14/19 


TIME: 12:09





Assessment/Plan


VTE Prophylaxis


Risk score (from Nsg)>0 risk:  3


SCD applied (from Nsg):  Yes


Pharmacological prophylaxis:  LMWH





Lines/Catheters


IV Catheter Type (from Nrsg):  Saline Lock





Assessment/Plan


Hospital Course


SUBJECTIVE: Today patient with worsening cough.  She is also wheezing 


bilaterally.  She also reports dysuria.  No fevers or chills.





OBJECTIVE: Vital signs-see below








PHYSICAL EXAM:


Constitutional: Adequately built,not in acute distress.


HEENT: Head atraumatic and normocephalic. Eyes: Extraocular muscles  intact. 


Anicteric sclerae. Pupils equal bilaterally, reactive to light.


NECK: Supple without lymph node.


CHEST: Expiratory wheezing bilaterally. 


HEART: S1, S2. Regular rate and rhythm.


ABDOMEN: Soft/non tender with no rebound tenderness.  Bowel sounds were present.


EXTREMITIES:  No cyanosis, clubbing or edema.


NEUROLOGIC: Alert and oriented x3. No focal deficit. No sensory deficit.


PSYCHOSOCIAL: No signs of depression.


INTEGUMENTARY: No open wounds.





ASSESSMENT AND PLAN:63 yo w/copd,current every day smoker,dm neuropathy,anxiety,


chronic back pain, here w/cough/sob...





COPD exacerbation


-Continue around-the-clock Annie, Brovana nebulization, IV steroids


-Supplemental oxygen





Acute tracheobronchitis


-Patient with worsening cough and symptoms.  


-Change antibiotic to Augmentin plus Zithromax.  DC Levaquin. 


-Cough medications.





DM2


-Stable glycemic trends.  Continue NPH linked with steroid dose.


-Continue basal/bolus regimen.





HTN


-Stable


-on Isordil





Anxiety disorders


-Continue home medications


-Add PRN Xanax and trazodone at nighttime as she could not sleep last night.





Overactive bladder


-Pyridium for dysuria.  Obtain UA and culture to rule out UTI.


-On Ditropan





Tobacco abuse


-Counseled on cessation.  Provide nicotine patch





Chronic Back pain


-pain control





Obesity with a BMI 32.8


-Weight reduction advised





DVT prophylaxis: Lovenox





Disposition: Continue current management.  Await for clinical improvement.  


Assess for home oxygen requirement.





Patient was seen in collaboration with Dr. Marquez.


Result Diagram:  


7/12/19 1800                                                                    


           7/12/19 1800





Results 24hrs





Laboratory Tests


  Test
            7/13/19
12:24  7/13/19
18:32  7/13/19
21:41  7/14/19
08:12


  Bedside Glucose         294  H          206            151            139








Exam/Review of Systems


Exam


Vitals





Vital Signs


  Date      Temp  Pulse  Resp  B/P (MAP)   Pulse Ox  O2          O2 Flow    FiO2


Time                                                 Delivery    Rate


   7/14/19           70    18                    93  Nasal             2.0


     10:15                                           Cannula


   7/14/19  98.2                   142/66


     07:53                           (91)


   7/14/19 21


     07:48








Intake and Output





7/13/19 7/13/19 7/14/19





1515:00


23:00


07:00





IntakeIntake Total


400 ml


1260 ml


120 ml





OutputOutput Total


350 ml


700 ml


1000 ml





BalanceBalance


50 ml


560 ml


-880 ml














Results


Results 24hrs





Laboratory Tests


  Test
            7/13/19
12:24  7/13/19
18:32  7/13/19
21:41  7/14/19
08:12


  Bedside Glucose         294  H          206            151            139








Medications


Medication





Current Medications


IV Flush (NS 3 ml) 3 ml PER PROTOCOL IV ;  Start 7/12/19 at 20:30


Ondansetron HCl (Zofran Inj) 4 mg Q6H  PRN IV NAUSEA/VOMITING;  Start 7/12/19 at


20:30


Acetaminophen (Tylenol Tab) 650 mg Q6H  PRN PO .PAIN 1-3 OR TEMP Last 


administered on 7/13/19at 00:25; Admin Dose 650 MG;  Start 7/12/19 at 20:30


Docusate Sodium (Colace) 100 mg Q12H  PRN PO .CONSTIPATION;  Start 7/12/19 at 


20:30


Bisacodyl (Dulcolax) 5 mg DAILY  PRN PO .CONSTIPATION;  Start 7/12/19 at 20:30


Enoxaparin Sodium (Lovenox) 40 mg DAILY SC  Last administered on 7/14/19at 


09:56; Admin Dose 40 MG;  Start 7/13/19 at 09:00


Albuterol/ Ipratropium (Duoneb) 3 ml Q4H RESP  THERAPY HHN  Last administered on


7/14/19at 10:08; Admin Dose 3 ML;  Start 7/12/19 at 21:00


Levofloxacin (Levaquin) 750 mg DAILY@06 PO  Last administered on 7/14/19at 


05:52; Admin Dose 750 MG;  Start 7/13/19 at 06:00;  Stop 7/18/19 at 05:59


Gabapentin (Neurontin) 300 mg TID PO  Last administered on 7/14/19at 09:50; 


Admin Dose 300 MG;  Start 7/13/19 at 09:00


Nicotine (Nicoderm 14 Mg/ 24hr) 1 patch DAILY TRANSDERM  Last administered on 


7/14/19at 09:53; Admin Dose 1 PATCH;  Start 7/13/19 at 09:00


Albuterol (Proventil 0.083% (Neb)) 2.5 mg Q2H RESP THERAPY  PRN HHN SHORTNESS OF


BREATH;  Start 7/12/19 at 23:30


Diagnostic Test (Pha) (Accu-Chek) 1 ea 02 XX ;  Start 7/13/19 at 02:00


Insulin Aspart (Novolog Insulin Pen) NOVOLOG *MILD* ALGORITHM WITH MEALS  


BEDTIME SC  Last administered on 7/13/19at 18:46; Admin Dose 2 UNIT;  Start 


7/13/19 at 08:00


Miscellaneous Information 1 ea NOTE XX ;  Start 7/12/19 at 23:30


Glucose (Glutose) 15 gm Q15M  PRN PO DECREASED GLUCOSE;  Start 7/12/19 at 23:30


Glucose (Glutose) 22.5 gm Q15M  PRN PO DECREASED GLUCOSE;  Start 7/12/19 at 


23:30


Dextrose (D50w Syringe) 25 ml Q15M  PRN IV DECREASED GLUCOSE;  Start 7/12/19 at 


23:30


Dextrose (D50w Syringe) 50 ml Q15M  PRN IV DECREASED GLUCOSE;  Start 7/12/19 at 


23:30


Glucagon (Glucagen) 1 mg Q15M  PRN IM DECREASED GLUCOSE;  Start 7/12/19 at 23:30


Glucose (Glutose) 15 gm Q15M  PRN BUCCAL DECREASED GLUCOSE;  Start 7/12/19 at 


23:30


Zolpidem Tartrate (Ambien) 5 mg HS  PRN PO INSOMNIA Last administered on 


7/13/19at 00:25; Admin Dose 5 MG;  Start 7/12/19 at 23:30


Baclofen (Lioresal) 10 mg DAILY PO  Last administered on 7/14/19at 09:51; Admin 


Dose 10 MG;  Start 7/13/19 at 09:00


Bupropion HCl (Wellbutrin Sr) 150 mg BID PO  Last administered on 7/14/19at 


09:51; Admin Dose 150 MG;  Start 7/13/19 at 09:00


Carisoprodol (Soma) 350 mg Q12 PO  Last administered on 7/14/19at 09:51; Admin 


Dose 350 MG;  Start 7/13/19 at 09:00


Docusate Sodium (Colace) 100 mg TID PO  Last administered on 7/14/19at 09:51; 


Admin Dose 100 MG;  Start 7/13/19 at 09:00


Isosorbide Dinitrate (Isordil) 10 mg TID PO  Last administered on 7/14/19at 


09:52; Admin Dose 10 MG;  Start 7/13/19 at 09:00


Oxybutynin Chloride (Ditropan Xl) 10 mg DAILY PO  Last administered on 7/14/19at


09:51; Admin Dose 10 MG;  Start 7/13/19 at 09:00


Phenazopyridine HCl (Pyridium) 100 mg TID  PRN PO dysuria;  Start 7/13/19 at 


04:30


Polyethylene Glycol (Miralax) 17 gm DAILY PO  Last administered on 7/14/19at 


09:51; Admin Dose 17 GM;  Start 7/13/19 at 09:00


Tramadol HCl (Ultram) 100 mg Q6H  PRN PO PAIN LEVEL 1-3 Last administered on 


7/14/19at 03:12; Admin Dose 100 MG;  Start 7/13/19 at 04:30


Pantoprazole (Protonix Tab) 40 mg DAILY@06 PO  Last administered on 7/14/19at 


05:52; Admin Dose 40 MG;  Start 7/13/19 at 06:00


Arformoterol Tartrate (Brovana (Neb)) 2 ml BID RESP  THERAPY INH  Last adm


inistered on 7/13/19at 19:49; Admin Dose 2 ML;  Start 7/13/19 at 20:00


Budesonide (Pulmicort (Neb)) 0.5 mg BID RESP  THERAPY INH  Last administered on 


7/14/19at 07:55; Admin Dose 0.5 MG;  Start 7/13/19 at 20:00


Methylprednisolone Sodium Succinate (Solu-Medrol) 40 mg Q8 IV  Last administered


on 7/14/19at 05:49; Admin Dose 40 MG;  Start 7/13/19 at 14:00


Morphine Sulfate (morphine) 2 mg Q4H  PRN IV SEVERE PAIN LEVEL 7-10 Last 


administered on 7/14/19at 11:11; Admin Dose 2 MG;  Start 7/13/19 at 12:30


Tiotropium Bromide (Spiriva) 1 inh DAILY INH  Last administered on 7/14/19at 


09:51; Admin Dose 1 INH;  Start 7/13/19 at 13:30


Insulin Human NPH (Humulin N) 10 unit Q8 SC  Last administered on 7/14/19at 


05:52; Admin Dose 10 UNIT;  Start 7/13/19 at 14:00


Guaifenesin/ Dextromethorphan (Robitussin Dm Liquid Cup) 10 ml Q4H  PRN PO cough


Last administered on 7/14/19at 03:11; Admin Dose 10 ML;  Start 7/13/19 at 12:30











LETICIA MOFFETT NP               Jul 14, 2019 12:14

## 2019-07-15 VITALS — SYSTOLIC BLOOD PRESSURE: 129 MMHG | HEART RATE: 71 BPM | RESPIRATION RATE: 18 BRPM | DIASTOLIC BLOOD PRESSURE: 63 MMHG

## 2019-07-15 VITALS — DIASTOLIC BLOOD PRESSURE: 68 MMHG | SYSTOLIC BLOOD PRESSURE: 144 MMHG | HEART RATE: 78 BPM | RESPIRATION RATE: 20 BRPM

## 2019-07-15 VITALS — HEART RATE: 53 BPM | SYSTOLIC BLOOD PRESSURE: 116 MMHG | RESPIRATION RATE: 20 BRPM | DIASTOLIC BLOOD PRESSURE: 56 MMHG

## 2019-07-15 VITALS — HEART RATE: 86 BPM | RESPIRATION RATE: 17 BRPM | SYSTOLIC BLOOD PRESSURE: 157 MMHG | DIASTOLIC BLOOD PRESSURE: 72 MMHG

## 2019-07-15 RX ADMIN — IPRATROPIUM BROMIDE AND ALBUTEROL SULFATE SCH ML: .5; 3 SOLUTION RESPIRATORY (INHALATION) at 20:01

## 2019-07-15 RX ADMIN — OXYBUTYNIN CHLORIDE SCH MG: 5 TABLET, EXTENDED RELEASE ORAL at 08:44

## 2019-07-15 RX ADMIN — IPRATROPIUM BROMIDE AND ALBUTEROL SULFATE SCH ML: .5; 3 SOLUTION RESPIRATORY (INHALATION) at 13:00

## 2019-07-15 RX ADMIN — MORPHINE SULFATE PRN MG: 2 INJECTION, SOLUTION INTRAMUSCULAR; INTRAVENOUS at 14:10

## 2019-07-15 RX ADMIN — PHENAZOPYRIDINE HYDROCHLORIDE SCH MG: 100 TABLET ORAL at 14:10

## 2019-07-15 RX ADMIN — BACLOFEN SCH MG: 10 TABLET ORAL at 08:44

## 2019-07-15 RX ADMIN — GABAPENTIN SCH MG: 300 CAPSULE ORAL at 08:43

## 2019-07-15 RX ADMIN — PANTOPRAZOLE SODIUM SCH MG: 40 TABLET, DELAYED RELEASE ORAL at 06:34

## 2019-07-15 RX ADMIN — MORPHINE SULFATE PRN MG: 2 INJECTION, SOLUTION INTRAMUSCULAR; INTRAVENOUS at 09:15

## 2019-07-15 RX ADMIN — ARFORMOTEROL TARTRATE SCH ML: 15 SOLUTION RESPIRATORY (INHALATION) at 20:00

## 2019-07-15 RX ADMIN — ISOSORBIDE DINITRATE SCH MG: 10 TABLET ORAL at 14:11

## 2019-07-15 RX ADMIN — DOCUSATE SODIUM SCH MG: 100 CAPSULE, LIQUID FILLED ORAL at 20:21

## 2019-07-15 RX ADMIN — INSULIN HUMAN SCH UNIT: 100 INJECTION, SUSPENSION SUBCUTANEOUS at 06:40

## 2019-07-15 RX ADMIN — CARISOPRODOL SCH MG: 350 TABLET ORAL at 08:43

## 2019-07-15 RX ADMIN — IPRATROPIUM BROMIDE AND ALBUTEROL SULFATE SCH ML: .5; 3 SOLUTION RESPIRATORY (INHALATION) at 04:28

## 2019-07-15 RX ADMIN — BUDESONIDE SCH MG: 0.5 SUSPENSION RESPIRATORY (INHALATION) at 08:38

## 2019-07-15 RX ADMIN — MORPHINE SULFATE PRN MG: 2 INJECTION, SOLUTION INTRAMUSCULAR; INTRAVENOUS at 20:21

## 2019-07-15 RX ADMIN — CARISOPRODOL SCH MG: 350 TABLET ORAL at 20:21

## 2019-07-15 RX ADMIN — POLYETHYLENE GLYCOL 3350 SCH GM: 17 POWDER, FOR SOLUTION ORAL at 08:41

## 2019-07-15 RX ADMIN — GUAIFENESIN AND DEXTROMETHORPHAN HYDROBROMIDE SCH TAB: 600; 30 TABLET, EXTENDED RELEASE ORAL at 08:44

## 2019-07-15 RX ADMIN — ISOSORBIDE DINITRATE SCH MG: 10 TABLET ORAL at 08:44

## 2019-07-15 RX ADMIN — AMOXICILLIN AND CLAVULANATE POTASSIUM SCH MG: 875; 125 TABLET, FILM COATED ORAL at 20:21

## 2019-07-15 RX ADMIN — INSULIN HUMAN SCH UNIT: 100 INJECTION, SUSPENSION SUBCUTANEOUS at 22:20

## 2019-07-15 RX ADMIN — AZITHROMYCIN SCH MG: 500 TABLET, FILM COATED ORAL at 08:43

## 2019-07-15 RX ADMIN — DOCUSATE SODIUM SCH MG: 100 CAPSULE, LIQUID FILLED ORAL at 11:46

## 2019-07-15 RX ADMIN — IPRATROPIUM BROMIDE AND ALBUTEROL SULFATE SCH ML: .5; 3 SOLUTION RESPIRATORY (INHALATION) at 08:38

## 2019-07-15 RX ADMIN — DOCUSATE SODIUM SCH MG: 100 CAPSULE, LIQUID FILLED ORAL at 08:43

## 2019-07-15 RX ADMIN — ISOSORBIDE DINITRATE SCH MG: 10 TABLET ORAL at 20:22

## 2019-07-15 RX ADMIN — NICOTINE SCH PATCH: 14 PATCH, EXTENDED RELEASE TRANSDERMAL at 08:43

## 2019-07-15 RX ADMIN — ZOLPIDEM TARTRATE PRN MG: 5 TABLET, FILM COATED ORAL at 22:10

## 2019-07-15 RX ADMIN — GUAIFENESIN AND DEXTROMETHORPHAN HYDROBROMIDE SCH TAB: 600; 30 TABLET, EXTENDED RELEASE ORAL at 20:21

## 2019-07-15 RX ADMIN — INSULIN HUMAN SCH UNIT: 100 INJECTION, SUSPENSION SUBCUTANEOUS at 14:19

## 2019-07-15 RX ADMIN — PHENAZOPYRIDINE HYDROCHLORIDE SCH MG: 100 TABLET ORAL at 20:21

## 2019-07-15 RX ADMIN — GABAPENTIN SCH MG: 300 CAPSULE ORAL at 14:10

## 2019-07-15 RX ADMIN — ENOXAPARIN SODIUM SCH MG: 100 INJECTION SUBCUTANEOUS at 08:50

## 2019-07-15 RX ADMIN — PHENAZOPYRIDINE HYDROCHLORIDE SCH MG: 100 TABLET ORAL at 08:43

## 2019-07-15 RX ADMIN — IPRATROPIUM BROMIDE AND ALBUTEROL SULFATE SCH ML: .5; 3 SOLUTION RESPIRATORY (INHALATION) at 00:22

## 2019-07-15 RX ADMIN — AMOXICILLIN AND CLAVULANATE POTASSIUM SCH MG: 875; 125 TABLET, FILM COATED ORAL at 08:44

## 2019-07-15 RX ADMIN — GABAPENTIN SCH MG: 300 CAPSULE ORAL at 20:23

## 2019-07-15 RX ADMIN — BUPROPION HYDROCHLORIDE SCH MG: 150 TABLET, EXTENDED RELEASE ORAL at 20:21

## 2019-07-15 RX ADMIN — ARFORMOTEROL TARTRATE SCH ML: 15 SOLUTION RESPIRATORY (INHALATION) at 08:39

## 2019-07-15 RX ADMIN — BUPROPION HYDROCHLORIDE SCH MG: 150 TABLET, EXTENDED RELEASE ORAL at 08:43

## 2019-07-15 RX ADMIN — IPRATROPIUM BROMIDE AND ALBUTEROL SULFATE SCH ML: .5; 3 SOLUTION RESPIRATORY (INHALATION) at 16:45

## 2019-07-15 RX ADMIN — BUDESONIDE SCH MG: 0.5 SUSPENSION RESPIRATORY (INHALATION) at 20:01

## 2019-07-15 RX ADMIN — SENNOSIDES SCH TAB: 8.6 TABLET, FILM COATED ORAL at 20:22

## 2019-07-15 NOTE — PN
Date/Time of Note


Date/Time of Note


DATE: 7/15/19 


TIME: 09:46





Assessment/Plan


VTE Prophylaxis


Risk score (from Nsg)>0 risk:  4


SCD applied (from Nsg):  Yes


Pharmacological prophylaxis:  LMWH





Lines/Catheters


IV Catheter Type (from Nrsg):  Saline Lock





Assessment/Plan


Hospital Course


SUBJECTIVE: Patient is doing well in terms of respiratory status today.  She is 


with improved work of breathing and improved cough status.


      Patient reports constipation and her last bowel movement was 4 days ago.  


She is also with mild abdominal distention.





OBJECTIVE: Vital signs-see below








PHYSICAL EXAM:


Constitutional: Adequately built,not in acute distress.


HEENT: Head atraumatic and normocephalic. Eyes: Extraocular muscles  intact. 


Anicteric sclerae. Pupils equal bilaterally, reactive to light.


NECK: Supple without lymph node.


CHEST: Expiratory wheezing bilaterally-improving. 


HEART: S1, S2. Regular rate and rhythm.


ABDOMEN: Slightly distended, no tenderness.  Bowel sounds were present.


EXTREMITIES:  No cyanosis, clubbing or edema.


NEUROLOGIC: Alert and oriented x3. No focal deficit. No sensory deficit.


PSYCHOSOCIAL: No signs of depression.


INTEGUMENTARY: No open wounds.





ASSESSMENT AND PLAN:65 yo w/copd,current every day smoker,dm neuropathy,anxiety,


chronic back pain, here w/cough/sob...





COPD exacerbation


-Improving


-Continue around-the-clock Annie, Brovana nebulization, IV steroids (start 


tapering in am)


-Supplemental oxygen





Acute tracheobronchitis


-Improving on Augmentin plus Zithromax.  


-Cough medications.





DM2


-Stable glycemic trends.  Continue NPH linked with steroid dose.


-Continue basal/bolus regimen.





HTN


-Stable


-on Isordil





Anxiety disorders


-Continue home medications


- PRN Xanax 





Overactive bladder


-Pyridium PRN for dysuria.  


-On Ditropan





Tobacco abuse


-Counseled on cessation.  Provide nicotine patch





Chronic Back pain


-pain control





Obesity with a BMI 32.8


-Weight reduction advised





Constipation


-Fleet enema x1


-Stool softeners/laxatives





DVT prophylaxis: Lovenox





Disposition: Continue current management.  Await for further clinical 


improvement.  Arrange home oxygen.  Anticipate discharge in 24 to 48 hours.





Patient was seen in collaboration with .


Result Diagram:  


7/12/19 1800                                                                    


           7/12/19 1800





Results 24hrs





Laboratory Tests


Test
              7/14/19
12:17     7/14/19
14:07  7/14/19
15:20  7/14/19
17:53


Bedside Glucose            140               154                           152


Urine Color                                         YELLOW


Urine Clarity                                       CLEAR


Urine pH                                                    7.0


Urine Specific                                            1.009


Gravity


Urine Ketones                                       NEGATIVE


Urine Nitrite                                       NEGATIVE


Urine Bilirubin                                     NEGATIVE


Urine                                               NEGATIVE


Urobilinogen


Urine Leukocyte                                     NEGATIVE


Esterase


Urine Hemoglobin                                    NEGATIVE


Urine Glucose                                       NEGATIVE


Urine Total                                         NEGATIVE


Protein


Test
              7/14/19
21:00     7/14/19
21:15  7/15/19
06:33  7/15/19
08:41


Bedside Glucose            143                              148            120


Blood Gas          
              Blood arterial
   
              



Specimen Source



Arterial Blood     
              7/14/2019
9:15:4  
              



Date Drawn
                                   8 PM


Arterial Blood pH  
                      7.410  
  
              



(Temp
corrected)


Arterial Blood     
                      50.0  H
  
              



pCO2


(Temp
correct)


Arterial Blood     
                     52.7  *L
  
              



pO2


(Temp
corrected)


Arterial Blood                             31.0  H


HCO3


Arterial Blood                              5.1  H


Base Excess


Arterial Blood     
                      87.7  L
  
              



Oxygen
Saturation


Lamin Test                        ACCEPTAB


Arterial Blood     
              Right Radial  
   
              



Gas Puncture
Site


Arterial           
                        0.8  
  
              



Blood
Carboxyhemo


globin


Arterial Blood                               0.2


Methemoglobin


Blood Gas A-a O2                           37.2  H


Differential


Oxyhemoglobin                              86.8  L


Percent


Blood Gas                                   37.0


Temperature


Blood Gas                         ROOM AIR


Modality


FiO2                                        21.0


Blood Gas          
              LUANNE TAVAREZ MD  
  
              



Critical Value


Read
Back


Blood Gas                         MA


Notified Whom


Blood Gas          
              7/14/2019
9:27:3  
              



Notified Time
                                7 PM








Exam/Review of Systems


Exam


Vitals





Vital Signs


  Date      Temp  Pulse  Resp  B/P (MAP)   Pulse Ox  O2          O2 Flow    FiO2


Time                                                 Delivery    Rate


   7/15/19           74    20                    94  Nasal             2.0


     08:39                                           Cannula


   7/15/19  97.4                   116/56


     07:24                           (76)


   7/14/19                                                                    21


     07:48








Intake and Output





7/14/19


7/14/19


7/15/19





1515:00


23:00


07:00





IntakeIntake Total


240 ml


840 ml


360 ml





OutputOutput Total


1200 ml


500 ml


1500 ml





BalanceBalance


-960 ml


340 ml


-1140 ml














Results


Results 24hrs





Laboratory Tests


Test
              7/14/19
12:17     7/14/19
14:07  7/14/19
15:20  7/14/19
17:53


Bedside Glucose            140               154                           152


Urine Color                                         YELLOW


Urine Clarity                                       CLEAR


Urine pH                                                    7.0


Urine Specific                                            1.009


Gravity


Urine Ketones                                       NEGATIVE


Urine Nitrite                                       NEGATIVE


Urine Bilirubin                                     NEGATIVE


Urine                                               NEGATIVE


Urobilinogen


Urine Leukocyte                                     NEGATIVE


Esterase


Urine Hemoglobin                                    NEGATIVE


Urine Glucose                                       NEGATIVE


Urine Total                                         NEGATIVE


Protein


Test
              7/14/19
21:00     7/14/19
21:15  7/15/19
06:33  7/15/19
08:41


Bedside Glucose            143                              148            120


Blood Gas          
              Blood arterial
   
              



Specimen Source



Arterial Blood     
              7/14/2019
9:15:4  
              



Date Drawn
                                   8 PM


Arterial Blood pH  
                      7.410  
  
              



(Temp
corrected)


Arterial Blood     
                      50.0  H
  
              



pCO2


(Temp
correct)


Arterial Blood     
                     52.7  *L
  
              



pO2


(Temp
corrected)


Arterial Blood                             31.0  H


HCO3


Arterial Blood                              5.1  H


Base Excess


Arterial Blood     
                      87.7  L
  
              



Oxygen
Saturation


Lamin Test                        ACCEPTAB


Arterial Blood     
              Right Radial  
   
              



Gas Puncture
Site


Arterial           
                        0.8  
  
              



Blood
Carboxyhemo


globin


Arterial Blood                               0.2


Methemoglobin


Blood Gas A-a O2                           37.2  H


Differential


Oxyhemoglobin                              86.8  L


Percent


Blood Gas                                   37.0


Temperature


Blood Gas                         ROOM AIR


Modality


FiO2                                        21.0


Blood Gas          
              LUANNE TAVAREZ MD  
  
              



Critical Value


Read
Back


Blood Gas                         MA


Notified Whom


Blood Gas          
              7/14/2019
9:27:3  
              



Notified Time
                                7 PM








Medications


Medication





Current Medications


IV Flush (NS 3 ml) 3 ml PER PROTOCOL IV ;  Start 7/12/19 at 20:30


Ondansetron HCl (Zofran Inj) 4 mg Q6H  PRN IV NAUSEA/VOMITING;  Start 7/12/19 at


20:30


Acetaminophen (Tylenol Tab) 650 mg Q6H  PRN PO .PAIN 1-3 OR TEMP Last 


administered on 7/13/19at 00:25; Admin Dose 650 MG;  Start 7/12/19 at 20:30


Docusate Sodium (Colace) 100 mg Q12H  PRN PO .CONSTIPATION;  Start 7/12/19 at 


20:30


Bisacodyl (Dulcolax) 5 mg DAILY  PRN PO .CONSTIPATION;  Start 7/12/19 at 20:30


Enoxaparin Sodium (Lovenox) 40 mg DAILY SC  Last administered on 7/15/19at 


08:50; Admin Dose 40 MG;  Start 7/13/19 at 09:00


Albuterol/ Ipratropium (Duoneb) 3 ml Q4H RESP  THERAPY HHN  Last administered on


7/15/19at 08:38; Admin Dose 3 ML;  Start 7/12/19 at 21:00


Gabapentin (Neurontin) 300 mg TID PO  Last administered on 7/15/19at 08:43; 


Admin Dose 300 MG;  Start 7/13/19 at 09:00


Nicotine (Nicoderm 14 Mg/ 24hr) 1 patch DAILY TRANSDERM  Last administered on 


7/15/19at 08:43; Admin Dose 1 PATCH;  Start 7/13/19 at 09:00


Albuterol (Proventil 0.083% (Neb)) 2.5 mg Q2H RESP THERAPY  PRN HHN SHORTNESS OF


BREATH;  Start 7/12/19 at 23:30


Diagnostic Test (Pha) (Accu-Chek) 1 ea 02 XX ;  Start 7/13/19 at 02:00


Insulin Aspart (Novolog Insulin Pen) NOVOLOG *MILD* ALGORITHM WITH MEALS  


BEDTIME SC  Last administered on 7/14/19at 17:56; Admin Dose 1 UNIT;  Start 


7/13/19 at 08:00


Miscellaneous Information 1 ea NOTE XX ;  Start 7/12/19 at 23:30


Glucose (Glutose) 15 gm Q15M  PRN PO DECREASED GLUCOSE;  Start 7/12/19 at 23:30


Glucose (Glutose) 22.5 gm Q15M  PRN PO DECREASED GLUCOSE;  Start 7/12/19 at 


23:30


Dextrose (D50w Syringe) 25 ml Q15M  PRN IV DECREASED GLUCOSE;  Start 7/12/19 at 


23:30


Dextrose (D50w Syringe) 50 ml Q15M  PRN IV DECREASED GLUCOSE;  Start 7/12/19 at 


23:30


Glucagon (Glucagen) 1 mg Q15M  PRN IM DECREASED GLUCOSE;  Start 7/12/19 at 23:30


Glucose (Glutose) 15 gm Q15M  PRN BUCCAL DECREASED GLUCOSE;  Start 7/12/19 at 


23:30


Zolpidem Tartrate (Ambien) 5 mg HS  PRN PO INSOMNIA Last administered on 


7/14/19at 23:50; Admin Dose 5 MG;  Start 7/12/19 at 23:30


Baclofen (Lioresal) 10 mg DAILY PO  Last administered on 7/15/19at 08:44; Admin 


Dose 10 MG;  Start 7/13/19 at 09:00


Bupropion HCl (Wellbutrin Sr) 150 mg BID PO  Last administered on 7/15/19at 


08:43; Admin Dose 150 MG;  Start 7/13/19 at 09:00


Carisoprodol (Soma) 350 mg Q12 PO  Last administered on 7/15/19at 08:43; Admin 


Dose 350 MG;  Start 7/13/19 at 09:00


Docusate Sodium (Colace) 100 mg TID PO  Last administered on 7/15/19at 08:43; 


Admin Dose 100 MG;  Start 7/13/19 at 09:00


Isosorbide Dinitrate (Isordil) 10 mg TID PO  Last administered on 7/15/19at 08


:44; Admin Dose 10 MG;  Start 7/13/19 at 09:00


Oxybutynin Chloride (Ditropan Xl) 10 mg DAILY PO  Last administered on 7/15/19at


08:44; Admin Dose 10 MG;  Start 7/13/19 at 09:00


Polyethylene Glycol (Miralax) 17 gm DAILY PO  Last administered on 7/15/19at 


08:41; Admin Dose 17 GM;  Start 7/13/19 at 09:00


Tramadol HCl (Ultram) 100 mg Q6H  PRN PO PAIN LEVEL 1-3 Last administered on 


7/15/19at 06:34; Admin Dose 100 MG;  Start 7/13/19 at 04:30


Pantoprazole (Protonix Tab) 40 mg DAILY@06 PO  Last administered on 7/15/19at 


06:34; Admin Dose 40 MG;  Start 7/13/19 at 06:00


Arformoterol Tartrate (Brovana (Neb)) 2 ml BID RESP  THERAPY INH  Last 


administered on 7/14/19at 20:26; Admin Dose 2 ML;  Start 7/13/19 at 20:00


Budesonide (Pulmicort (Neb)) 0.5 mg BID RESP  THERAPY INH  Last administered on 


7/15/19at 08:38; Admin Dose 0.5 MG;  Start 7/13/19 at 20:00


Methylprednisolone Sodium Succinate (Solu-Medrol) 40 mg Q8 IV  Last administered


on 7/15/19at 06:33; Admin Dose 40 MG;  Start 7/13/19 at 14:00


Morphine Sulfate (morphine) 2 mg Q4H  PRN IV SEVERE PAIN LEVEL 7-10 Last 


administered on 7/15/19at 09:15; Admin Dose 2 MG;  Start 7/13/19 at 12:30


Tiotropium Bromide (Spiriva) 1 inh DAILY INH  Last administered on 7/15/19at 


08:44; Admin Dose 1 INH;  Start 7/13/19 at 13:30


Insulin Human NPH (Humulin N) 10 unit Q8 SC  Last administered on 7/15/19at 


06:40; Admin Dose 10 UNIT;  Start 7/13/19 at 14:00


Guaifenesin/ Dextromethorphan (Robitussin Dm Liquid Cup) 10 ml Q4H  PRN PO cough


Last administered on 7/15/19at 09:15; Admin Dose 10 ML;  Start 7/13/19 at 12:30


Phenazopyridine HCl (Pyridium) 100 mg TID PO  Last administered on 7/15/19at 


08:43; Admin Dose 100 MG;  Start 7/14/19 at 13:00;  Stop 7/16/19 at 09:01


Azithromycin (Zithromax) 500 mg DAILY PO  Last administered on 7/15/19at 08:43; 


Admin Dose 500 MG;  Start 7/14/19 at 12:30;  Stop 7/19/19 at 12:29


Guaifenesin/ Dextromethorphan (Mucinex Dm) 1 tab BID PO  Last administered on 


7/15/19at 08:44; Admin Dose 1 TAB;  Start 7/14/19 at 13:00


Alprazolam (Xanax) 0.25 mg BID  PRN PO anxiety Last administered on 7/14/19at 


14:17; Admin Dose 0.25 MG;  Start 7/14/19 at 12:30


Trazodone HCl (Desyrel) 50 mg HS PO  Last administered on 7/14/19at 21:04; Admin


Dose 50 MG;  Start 7/14/19 at 21:00


Amoxicillin/ Clavulanate Potassium (Augmentin) 875 mg BID PO  Last administered 


on 7/15/19at 08:44; Admin Dose 875 MG;  Start 7/14/19 at 13:00











LETICIA MOFFETT NP               Jul 15, 2019 09:48

## 2019-07-16 VITALS — HEART RATE: 78 BPM | SYSTOLIC BLOOD PRESSURE: 146 MMHG | DIASTOLIC BLOOD PRESSURE: 65 MMHG | RESPIRATION RATE: 20 BRPM

## 2019-07-16 VITALS — SYSTOLIC BLOOD PRESSURE: 138 MMHG | DIASTOLIC BLOOD PRESSURE: 63 MMHG | HEART RATE: 69 BPM | RESPIRATION RATE: 20 BRPM

## 2019-07-16 VITALS — DIASTOLIC BLOOD PRESSURE: 58 MMHG | HEART RATE: 72 BPM | RESPIRATION RATE: 18 BRPM | SYSTOLIC BLOOD PRESSURE: 127 MMHG

## 2019-07-16 VITALS — RESPIRATION RATE: 18 BRPM | HEART RATE: 67 BPM | SYSTOLIC BLOOD PRESSURE: 159 MMHG | DIASTOLIC BLOOD PRESSURE: 69 MMHG

## 2019-07-16 RX ADMIN — DOCUSATE SODIUM SCH MG: 100 CAPSULE, LIQUID FILLED ORAL at 21:35

## 2019-07-16 RX ADMIN — IPRATROPIUM BROMIDE AND ALBUTEROL SULFATE SCH ML: .5; 3 SOLUTION RESPIRATORY (INHALATION) at 20:23

## 2019-07-16 RX ADMIN — BUPROPION HYDROCHLORIDE SCH MG: 150 TABLET, EXTENDED RELEASE ORAL at 09:07

## 2019-07-16 RX ADMIN — IPRATROPIUM BROMIDE AND ALBUTEROL SULFATE SCH ML: .5; 3 SOLUTION RESPIRATORY (INHALATION) at 14:30

## 2019-07-16 RX ADMIN — BUDESONIDE SCH MG: 0.5 SUSPENSION RESPIRATORY (INHALATION) at 08:34

## 2019-07-16 RX ADMIN — DOCUSATE SODIUM SCH MG: 100 CAPSULE, LIQUID FILLED ORAL at 09:06

## 2019-07-16 RX ADMIN — BACLOFEN SCH MG: 10 TABLET ORAL at 09:07

## 2019-07-16 RX ADMIN — ENOXAPARIN SODIUM SCH MG: 100 INJECTION SUBCUTANEOUS at 09:15

## 2019-07-16 RX ADMIN — GABAPENTIN SCH MG: 300 CAPSULE ORAL at 14:08

## 2019-07-16 RX ADMIN — IPRATROPIUM BROMIDE AND ALBUTEROL SULFATE SCH ML: .5; 3 SOLUTION RESPIRATORY (INHALATION) at 16:50

## 2019-07-16 RX ADMIN — CARISOPRODOL SCH MG: 350 TABLET ORAL at 21:34

## 2019-07-16 RX ADMIN — MORPHINE SULFATE PRN MG: 2 INJECTION, SOLUTION INTRAMUSCULAR; INTRAVENOUS at 11:14

## 2019-07-16 RX ADMIN — INSULIN HUMAN SCH UNIT: 100 INJECTION, SUSPENSION SUBCUTANEOUS at 21:46

## 2019-07-16 RX ADMIN — ISOSORBIDE DINITRATE SCH MG: 10 TABLET ORAL at 14:13

## 2019-07-16 RX ADMIN — ARFORMOTEROL TARTRATE SCH ML: 15 SOLUTION RESPIRATORY (INHALATION) at 08:33

## 2019-07-16 RX ADMIN — ISOSORBIDE DINITRATE SCH MG: 10 TABLET ORAL at 21:36

## 2019-07-16 RX ADMIN — IPRATROPIUM BROMIDE AND ALBUTEROL SULFATE SCH ML: .5; 3 SOLUTION RESPIRATORY (INHALATION) at 00:32

## 2019-07-16 RX ADMIN — ARFORMOTEROL TARTRATE SCH ML: 15 SOLUTION RESPIRATORY (INHALATION) at 20:00

## 2019-07-16 RX ADMIN — IPRATROPIUM BROMIDE AND ALBUTEROL SULFATE SCH ML: .5; 3 SOLUTION RESPIRATORY (INHALATION) at 04:46

## 2019-07-16 RX ADMIN — IPRATROPIUM BROMIDE AND ALBUTEROL SULFATE SCH ML: .5; 3 SOLUTION RESPIRATORY (INHALATION) at 08:16

## 2019-07-16 RX ADMIN — SENNOSIDES SCH TAB: 8.6 TABLET, FILM COATED ORAL at 21:36

## 2019-07-16 RX ADMIN — NICOTINE SCH PATCH: 14 PATCH, EXTENDED RELEASE TRANSDERMAL at 09:06

## 2019-07-16 RX ADMIN — PANTOPRAZOLE SODIUM SCH MG: 40 TABLET, DELAYED RELEASE ORAL at 05:47

## 2019-07-16 RX ADMIN — OXYBUTYNIN CHLORIDE SCH MG: 5 TABLET, EXTENDED RELEASE ORAL at 09:09

## 2019-07-16 RX ADMIN — IPRATROPIUM BROMIDE AND ALBUTEROL SULFATE SCH ML: .5; 3 SOLUTION RESPIRATORY (INHALATION) at 16:30

## 2019-07-16 RX ADMIN — BENZONATATE SCH MG: 100 CAPSULE ORAL at 14:08

## 2019-07-16 RX ADMIN — GABAPENTIN SCH MG: 300 CAPSULE ORAL at 21:37

## 2019-07-16 RX ADMIN — AMOXICILLIN AND CLAVULANATE POTASSIUM SCH MG: 875; 125 TABLET, FILM COATED ORAL at 09:07

## 2019-07-16 RX ADMIN — BUDESONIDE SCH MG: 0.5 SUSPENSION RESPIRATORY (INHALATION) at 20:23

## 2019-07-16 RX ADMIN — GUAIFENESIN AND DEXTROMETHORPHAN HYDROBROMIDE SCH TAB: 600; 30 TABLET, EXTENDED RELEASE ORAL at 09:08

## 2019-07-16 RX ADMIN — SENNOSIDES SCH TAB: 8.6 TABLET, FILM COATED ORAL at 09:07

## 2019-07-16 RX ADMIN — GUAIFENESIN AND DEXTROMETHORPHAN HYDROBROMIDE SCH TAB: 600; 30 TABLET, EXTENDED RELEASE ORAL at 21:38

## 2019-07-16 RX ADMIN — MORPHINE SULFATE PRN MG: 2 INJECTION, SOLUTION INTRAMUSCULAR; INTRAVENOUS at 20:08

## 2019-07-16 RX ADMIN — AMOXICILLIN AND CLAVULANATE POTASSIUM SCH MG: 875; 125 TABLET, FILM COATED ORAL at 21:36

## 2019-07-16 RX ADMIN — BUPROPION HYDROCHLORIDE SCH MG: 150 TABLET, EXTENDED RELEASE ORAL at 21:36

## 2019-07-16 RX ADMIN — INSULIN HUMAN SCH UNIT: 100 INJECTION, SUSPENSION SUBCUTANEOUS at 06:03

## 2019-07-16 RX ADMIN — PHENAZOPYRIDINE HYDROCHLORIDE SCH MG: 100 TABLET ORAL at 09:08

## 2019-07-16 RX ADMIN — BENZONATATE SCH MG: 100 CAPSULE ORAL at 21:35

## 2019-07-16 RX ADMIN — AZITHROMYCIN SCH MG: 500 TABLET, FILM COATED ORAL at 09:07

## 2019-07-16 RX ADMIN — ISOSORBIDE DINITRATE SCH MG: 10 TABLET ORAL at 09:07

## 2019-07-16 RX ADMIN — CARISOPRODOL SCH MG: 350 TABLET ORAL at 09:08

## 2019-07-16 RX ADMIN — GABAPENTIN SCH MG: 300 CAPSULE ORAL at 09:09

## 2019-07-16 NOTE — PN
Date/Time of Note


Date/Time of Note


DATE: 7/16/19 


TIME: 11:49





Assessment/Plan


VTE Prophylaxis


Risk score (from Nsg)>0 risk:  2


SCD applied (from Ns):  No


SCD contraindicated:  other


Pharmacological prophylaxis:  LMWH





Lines/Catheters


IV Catheter Type (from Nrs):  Peripheral IV


Urinary Cath still in place:  No





Assessment/Plan


Hospital Course


SUBJECTIVE: With improved respiratory status.  Patient has not had a bowel 


movement after enema yesterday.





OBJECTIVE: Vital signs-see below








PHYSICAL EXAM:


Constitutional: Adequately built,not in acute distress.


HEENT: Head atraumatic and normocephalic. Eyes: Extraocular muscles  intact. 


Anicteric sclerae. Pupils equal bilaterally, reactive to light.


NECK: Supple without lymph node.


CHEST: Expiratory wheezing bilaterally-improving. 


HEART: S1, S2. Regular rate and rhythm.


ABDOMEN:+Chronic Hernia. Slightly distended, no tenderness.  Bowel sounds were 


present.


EXTREMITIES:  No cyanosis, clubbing or edema.


NEUROLOGIC: Alert and oriented x3. No focal deficit. No sensory deficit.


PSYCHOSOCIAL: No signs of depression.


INTEGUMENTARY: No open wounds.





ASSESSMENT AND PLAN:63 yo w/copd,current every day smoker,dm neuropathy,anxiety,


chronic back pain, here w/cough/sob...





COPD exacerbation


-Improving


-Continue around-the-clock Annie, Brovana nebulization, IV steroids (taper today)


-Supplemental oxygen-home o2 arranged.





Acute tracheobronchitis


-Improving on Augmentin plus Zithromax.  


-Cough medications.





DM2


-Stable glycemic trends.  Continue NPH linked with steroid dose.


-Continue basal/bolus regimen.





HTN


-Stable


-on Isordil





Anxiety disorders


-Continue home medications


- PRN Xanax 





Overactive bladder


-Pyridium PRN for dysuria.  


-On Ditropan





Tobacco abuse


-Counseled on cessation.  Provide nicotine patch





Chronic Back pain


-pain control





Obesity with a BMI 32.8


-Weight reduction advised





Constipation


-repeat Fleet enema x1


-Stool softeners/laxatives





Chronic abdominal wall hernia


-Counseled on weight reduction and outpatient surgical follow-up.





DVT prophylaxis: Lovenox





Disposition: Overall patient is improving gradually.  Home oxygen arranged.  


Likely DC in a.m. with outpatient follow-up.





Patient was seen in collaboration with .


Result Diagram:  


7/12/19 1800                                                                    


           7/12/19 1800





Results 24hrs





Laboratory Tests


  Test
            7/15/19
12:36  7/15/19
14:15  7/15/19
17:29  7/15/19
20:14


  Bedside Glucose          150            172            149           239  H


  Test
            7/15/19
22:17  7/16/19
02:09  7/16/19
05:56  7/16/19
09:03


  Bedside Glucose          128            125            131            172








Exam/Review of Systems


Exam


Vitals





Vital Signs


  Date      Temp  Pulse  Resp  B/P (MAP)   Pulse Ox  O2          O2 Flow    FiO2


Time                                                 Delivery    Rate


   7/16/19           63    18                    94  Nasal             2.0


     10:09                                           Cannula


   7/16/19  97.9                   138/63


     07:40                           (88)


   7/14/19 21


     07:48








Intake and Output





7/15/19


7/15/19


7/16/19





1515:00


23:00


07:00





IntakeIntake Total


600 ml


240 ml


850 ml





OutputOutput Total


450 ml


1500 ml





BalanceBalance


150 ml


240 ml


-650 ml














Results


Results 24hrs





Laboratory Tests


  Test
            7/15/19
12:36  7/15/19
14:15  7/15/19
17:29  7/15/19
20:14


  Bedside Glucose          150            172            149           239  H


  Test
            7/15/19
22:17  7/16/19
02:09  7/16/19
05:56  7/16/19
09:03


  Bedside Glucose          128            125            131            172








Medications


Medication





Current Medications


IV Flush (NS 3 ml) 3 ml PER PROTOCOL IV ;  Start 7/12/19 at 20:30


Ondansetron HCl (Zofran Inj) 4 mg Q6H  PRN IV NAUSEA/VOMITING;  Start 7/12/19 at


20:30


Acetaminophen (Tylenol Tab) 650 mg Q6H  PRN PO .PAIN 1-3 OR TEMP Last 


administered on 7/13/19at 00:25; Admin Dose 650 MG;  Start 7/12/19 at 20:30


Bisacodyl (Dulcolax) 5 mg DAILY  PRN PO .CONSTIPATION;  Start 7/12/19 at 20:30


Enoxaparin Sodium (Lovenox) 40 mg DAILY SC  Last administered on 7/16/19at 


09:15; Admin Dose 40 MG;  Start 7/13/19 at 09:00


Albuterol/ Ipratropium (Duoneb) 3 ml Q4H RESP  THERAPY HHN  Last administered on


7/16/19at 08:16; Admin Dose 3 ML;  Start 7/12/19 at 21:00


Gabapentin (Neurontin) 300 mg TID PO  Last administered on 7/16/19at 09:09; 


Admin Dose 300 MG;  Start 7/13/19 at 09:00


Nicotine (Nicoderm 14 Mg/ 24hr) 1 patch DAILY TRANSDERM  Last administered on 


7/16/19at 09:06; Admin Dose 1 PATCH;  Start 7/13/19 at 09:00


Albuterol (Proventil 0.083% (Neb)) 2.5 mg Q2H RESP THERAPY  PRN HHN SHORTNESS OF


BREATH Last administered on 7/16/19at 10:08; Admin Dose 2.5 MG;  Start 7/12/19 


at 23:30


Diagnostic Test (Pha) (Accu-Chek) 1 ea 02 XX ;  Start 7/13/19 at 02:00


Insulin Aspart (Novolog Insulin Pen) NOVOLOG *MILD* ALGORITHM WITH MEALS  


BEDTIME SC  Last administered on 7/16/19at 09:14; Admin Dose 1 UNIT;  Start 


7/13/19 at 08:00


Miscellaneous Information 1 ea NOTE XX ;  Start 7/12/19 at 23:30


Glucose (Glutose) 15 gm Q15M  PRN PO DECREASED GLUCOSE;  Start 7/12/19 at 23:30


Glucose (Glutose) 22.5 gm Q15M  PRN PO DECREASED GLUCOSE;  Start 7/12/19 at 


23:30


Dextrose (D50w Syringe) 25 ml Q15M  PRN IV DECREASED GLUCOSE;  Start 7/12/19 at 


23:30


Dextrose (D50w Syringe) 50 ml Q15M  PRN IV DECREASED GLUCOSE;  Start 7/12/19 at 


23:30


Glucagon (Glucagen) 1 mg Q15M  PRN IM DECREASED GLUCOSE;  Start 7/12/19 at 23:30


Glucose (Glutose) 15 gm Q15M  PRN BUCCAL DECREASED GLUCOSE;  Start 7/12/19 at 


23:30


Zolpidem Tartrate (Ambien) 5 mg HS  PRN PO INSOMNIA Last administered on 


7/15/19at 22:10; Admin Dose 5 MG;  Start 7/12/19 at 23:30


Baclofen (Lioresal) 10 mg DAILY PO  Last administered on 7/16/19at 09:07; Admin 


Dose 10 MG;  Start 7/13/19 at 09:00


Bupropion HCl (Wellbutrin Sr) 150 mg BID PO  Last administered on 7/16/19at 


09:07; Admin Dose 150 MG;  Start 7/13/19 at 09:00


Carisoprodol (Soma) 350 mg Q12 PO  Last administered on 7/16/19at 09:08; Admin 


Dose 350 MG;  Start 7/13/19 at 09:00


Isosorbide Dinitrate (Isordil) 10 mg TID PO  Last administered on 7/16/19at 


09:07; Admin Dose 10 MG;  Start 7/13/19 at 09:00


Oxybutynin Chloride (Ditropan Xl) 10 mg DAILY PO  Last administered on 7/16/19at


09:09; Admin Dose 10 MG;  Start 7/13/19 at 09:00


Tramadol HCl (Ultram) 100 mg Q6H  PRN PO PAIN LEVEL 1-3 Last administered on 


7/15/19at 06:34; Admin Dose 100 MG;  Start 7/13/19 at 04:30


Pantoprazole (Protonix Tab) 40 mg DAILY@06 PO  Last administered on 7/16/19at 


05:47; Admin Dose 40 MG;  Start 7/13/19 at 06:00


Arformoterol Tartrate (Brovana (Neb)) 2 ml BID RESP  THERAPY INH  Last 


administered on 7/14/19at 20:26; Admin Dose 2 ML;  Start 7/13/19 at 20:00


Budesonide (Pulmicort (Neb)) 0.5 mg BID RESP  THERAPY INH  Last administered on 


7/16/19at 08:34; Admin Dose 0.5 MG;  Start 7/13/19 at 20:00


Methylprednisolone Sodium Succinate (Solu-Medrol) 40 mg Q8 IV  Last administered


on 7/16/19at 05:46; Admin Dose 40 MG;  Start 7/13/19 at 14:00


Morphine Sulfate (morphine) 2 mg Q4H  PRN IV SEVERE PAIN LEVEL 7-10 Last 


administered on 7/16/19at 11:14; Admin Dose 2 MG;  Start 7/13/19 at 12:30


Tiotropium Bromide (Spiriva) 1 inh DAILY INH  Last administered on 7/16/19at 


09:04; Admin Dose 1 INH;  Start 7/13/19 at 13:30


Insulin Human NPH (Humulin N) 10 unit Q8 SC  Last administered on 7/16/19at 


06:03; Admin Dose 10 UNIT;  Start 7/13/19 at 14:00


Guaifenesin/ Dextromethorphan (Robitussin Dm Liquid Cup) 10 ml Q4H  PRN PO cough


Last administered on 7/15/19at 20:19; Admin Dose 10 ML;  Start 7/13/19 at 12:30


Azithromycin (Zithromax) 500 mg DAILY PO  Last administered on 7/16/19at 09:07; 


Admin Dose 500 MG;  Start 7/14/19 at 12:30;  Stop 7/19/19 at 12:29


Guaifenesin/ Dextromethorphan (Mucinex Dm) 1 tab BID PO  Last administered on 


7/16/19at 09:08; Admin Dose 1 TAB;  Start 7/14/19 at 13:00


Alprazolam (Xanax) 0.25 mg BID  PRN PO anxiety Last administered on 7/14/19at 


14:17; Admin Dose 0.25 MG;  Start 7/14/19 at 12:30


Trazodone HCl (Desyrel) 50 mg HS PO  Last administered on 7/15/19at 20:22; Admin


Dose 50 MG;  Start 7/14/19 at 21:00


Amoxicillin/ Clavulanate Potassium (Augmentin) 875 mg BID PO  Last administered 


on 7/16/19at 09:07; Admin Dose 875 MG;  Start 7/14/19 at 13:00


Tramadol HCl (Ultram) 50 mg TID  PRN PO shoulder pain Last administered on 


7/16/19at 09:10; Admin Dose 50 MG;  Start 7/15/19 at 10:00


Psyllium Hydrophilic Mucilloid (Metamucil) 1 pkt DAILY PO  Last administered on 


7/16/19at 09:05; Admin Dose 1 PKT;  Start 7/15/19 at 10:30


Docusate Sodium (Colace) 200 mg BID PO  Last administered on 7/16/19at 09:06; 


Admin Dose 200 MG;  Start 7/15/19 at 10:30


Senna (Senokot) 2 tab BID PO  Last administered on 7/16/19at 09:07; Admin Dose 2


TAB;  Start 7/15/19 at 21:00











MOFFETT,LETICIA V. NP               Jul 16, 2019 11:52

## 2019-07-17 VITALS — RESPIRATION RATE: 18 BRPM | SYSTOLIC BLOOD PRESSURE: 133 MMHG | HEART RATE: 82 BPM | DIASTOLIC BLOOD PRESSURE: 68 MMHG

## 2019-07-17 VITALS — RESPIRATION RATE: 20 BRPM | SYSTOLIC BLOOD PRESSURE: 112 MMHG | DIASTOLIC BLOOD PRESSURE: 56 MMHG | HEART RATE: 64 BPM

## 2019-07-17 VITALS — SYSTOLIC BLOOD PRESSURE: 127 MMHG | RESPIRATION RATE: 20 BRPM | DIASTOLIC BLOOD PRESSURE: 60 MMHG | HEART RATE: 82 BPM

## 2019-07-17 VITALS — HEART RATE: 90 BPM | SYSTOLIC BLOOD PRESSURE: 128 MMHG | RESPIRATION RATE: 20 BRPM | DIASTOLIC BLOOD PRESSURE: 66 MMHG

## 2019-07-17 VITALS — DIASTOLIC BLOOD PRESSURE: 59 MMHG | SYSTOLIC BLOOD PRESSURE: 114 MMHG | RESPIRATION RATE: 22 BRPM

## 2019-07-17 RX ADMIN — SENNOSIDES SCH TAB: 8.6 TABLET, FILM COATED ORAL at 09:07

## 2019-07-17 RX ADMIN — GABAPENTIN SCH MG: 300 CAPSULE ORAL at 12:56

## 2019-07-17 RX ADMIN — BACLOFEN SCH MG: 10 TABLET ORAL at 09:07

## 2019-07-17 RX ADMIN — MORPHINE SULFATE PRN MG: 2 INJECTION, SOLUTION INTRAMUSCULAR; INTRAVENOUS at 22:43

## 2019-07-17 RX ADMIN — CARISOPRODOL SCH MG: 350 TABLET ORAL at 09:06

## 2019-07-17 RX ADMIN — DOCUSATE SODIUM SCH MG: 100 CAPSULE, LIQUID FILLED ORAL at 20:44

## 2019-07-17 RX ADMIN — IPRATROPIUM BROMIDE AND ALBUTEROL SULFATE SCH ML: .5; 3 SOLUTION RESPIRATORY (INHALATION) at 13:41

## 2019-07-17 RX ADMIN — GABAPENTIN SCH MG: 300 CAPSULE ORAL at 20:44

## 2019-07-17 RX ADMIN — BUPROPION HYDROCHLORIDE SCH MG: 150 TABLET, EXTENDED RELEASE ORAL at 09:06

## 2019-07-17 RX ADMIN — IPRATROPIUM BROMIDE AND ALBUTEROL SULFATE SCH ML: .5; 3 SOLUTION RESPIRATORY (INHALATION) at 17:28

## 2019-07-17 RX ADMIN — ISOSORBIDE DINITRATE SCH MG: 10 TABLET ORAL at 12:57

## 2019-07-17 RX ADMIN — BUDESONIDE SCH MG: 0.5 SUSPENSION RESPIRATORY (INHALATION) at 19:56

## 2019-07-17 RX ADMIN — BENZONATATE SCH MG: 100 CAPSULE ORAL at 09:06

## 2019-07-17 RX ADMIN — IPRATROPIUM BROMIDE AND ALBUTEROL SULFATE SCH ML: .5; 3 SOLUTION RESPIRATORY (INHALATION) at 00:37

## 2019-07-17 RX ADMIN — ISOSORBIDE DINITRATE SCH MG: 10 TABLET ORAL at 20:43

## 2019-07-17 RX ADMIN — GUAIFENESIN AND DEXTROMETHORPHAN HYDROBROMIDE SCH TAB: 600; 30 TABLET, EXTENDED RELEASE ORAL at 09:06

## 2019-07-17 RX ADMIN — ARFORMOTEROL TARTRATE SCH ML: 15 SOLUTION RESPIRATORY (INHALATION) at 19:56

## 2019-07-17 RX ADMIN — ARFORMOTEROL TARTRATE SCH ML: 15 SOLUTION RESPIRATORY (INHALATION) at 08:06

## 2019-07-17 RX ADMIN — AMOXICILLIN AND CLAVULANATE POTASSIUM SCH MG: 875; 125 TABLET, FILM COATED ORAL at 09:06

## 2019-07-17 RX ADMIN — BENZONATATE SCH MG: 100 CAPSULE ORAL at 20:42

## 2019-07-17 RX ADMIN — GUAIFENESIN AND DEXTROMETHORPHAN HYDROBROMIDE SCH TAB: 600; 30 TABLET, EXTENDED RELEASE ORAL at 20:43

## 2019-07-17 RX ADMIN — GABAPENTIN SCH MG: 300 CAPSULE ORAL at 09:07

## 2019-07-17 RX ADMIN — AMOXICILLIN AND CLAVULANATE POTASSIUM SCH MG: 875; 125 TABLET, FILM COATED ORAL at 20:43

## 2019-07-17 RX ADMIN — BISACODYL PRN MG: 5 TABLET, COATED ORAL at 07:56

## 2019-07-17 RX ADMIN — CARISOPRODOL SCH MG: 350 TABLET ORAL at 20:44

## 2019-07-17 RX ADMIN — ENOXAPARIN SODIUM SCH MG: 100 INJECTION SUBCUTANEOUS at 09:04

## 2019-07-17 RX ADMIN — ZOLPIDEM TARTRATE PRN MG: 5 TABLET, FILM COATED ORAL at 20:57

## 2019-07-17 RX ADMIN — IPRATROPIUM BROMIDE AND ALBUTEROL SULFATE SCH ML: .5; 3 SOLUTION RESPIRATORY (INHALATION) at 08:06

## 2019-07-17 RX ADMIN — BUPROPION HYDROCHLORIDE SCH MG: 150 TABLET, EXTENDED RELEASE ORAL at 20:43

## 2019-07-17 RX ADMIN — BENZONATATE SCH MG: 100 CAPSULE ORAL at 12:56

## 2019-07-17 RX ADMIN — IPRATROPIUM BROMIDE AND ALBUTEROL SULFATE SCH ML: .5; 3 SOLUTION RESPIRATORY (INHALATION) at 20:00

## 2019-07-17 RX ADMIN — OXYBUTYNIN CHLORIDE SCH MG: 5 TABLET, EXTENDED RELEASE ORAL at 09:08

## 2019-07-17 RX ADMIN — AZITHROMYCIN SCH MG: 500 TABLET, FILM COATED ORAL at 09:07

## 2019-07-17 RX ADMIN — PANTOPRAZOLE SODIUM SCH MG: 40 TABLET, DELAYED RELEASE ORAL at 05:44

## 2019-07-17 RX ADMIN — ISOSORBIDE DINITRATE SCH MG: 10 TABLET ORAL at 09:07

## 2019-07-17 RX ADMIN — SENNOSIDES SCH TAB: 8.6 TABLET, FILM COATED ORAL at 20:43

## 2019-07-17 RX ADMIN — INSULIN HUMAN SCH UNIT: 100 INJECTION, SUSPENSION SUBCUTANEOUS at 09:11

## 2019-07-17 RX ADMIN — INSULIN HUMAN SCH UNIT: 100 INJECTION, SUSPENSION SUBCUTANEOUS at 20:41

## 2019-07-17 RX ADMIN — DOCUSATE SODIUM SCH MG: 100 CAPSULE, LIQUID FILLED ORAL at 09:07

## 2019-07-17 RX ADMIN — MORPHINE SULFATE PRN MG: 2 INJECTION, SOLUTION INTRAMUSCULAR; INTRAVENOUS at 17:48

## 2019-07-17 RX ADMIN — BUDESONIDE SCH MG: 0.5 SUSPENSION RESPIRATORY (INHALATION) at 08:07

## 2019-07-17 RX ADMIN — MORPHINE SULFATE PRN MG: 2 INJECTION, SOLUTION INTRAMUSCULAR; INTRAVENOUS at 07:56

## 2019-07-17 RX ADMIN — NICOTINE SCH PATCH: 14 PATCH, EXTENDED RELEASE TRANSDERMAL at 09:06

## 2019-07-17 RX ADMIN — IPRATROPIUM BROMIDE AND ALBUTEROL SULFATE SCH ML: .5; 3 SOLUTION RESPIRATORY (INHALATION) at 05:06

## 2019-07-17 NOTE — PN
Date/Time of Note


Date/Time of Note


DATE: 7/17/19 


TIME: 12:03





Assessment/Plan


VTE Prophylaxis


Risk score (from Nsg)>0 risk:  6


SCD applied (from Ns):  No


SCD contraindicated:  other


Pharmacological prophylaxis:  LMWH





Lines/Catheters


IV Catheter Type (from Nrsg):  Peripheral IV


Urinary Cath still in place:  No





Assessment/Plan


Hospital Course


SUBJECTIVE:Cont. to have wheezing and cough. c/o constipation


OBJECTIVE: Vital signs-see below








PHYSICAL EXAM:


Constitutional: Adequately built,not in acute distress.


HEENT: Head atraumatic and normocephalic. Eyes: Extraocular muscles  intact. 


Anicteric sclerae. Pupils equal bilaterally, reactive to light.


NECK: Supple without lymph node.


CHEST: Expiratory wheezing bilaterally-improving. 


HEART: S1, S2. Regular rate and rhythm.


ABDOMEN:+Chronic Hernia. Slightly distended, no tenderness.  Bowel sounds were 


present.


EXTREMITIES:  No cyanosis, clubbing or edema.


NEUROLOGIC: Alert and oriented x3. No focal deficit. No sensory deficit.


PSYCHOSOCIAL: No signs of depression.


INTEGUMENTARY: No open wounds.





ASSESSMENT AND PLAN:65 yo w/copd,current every day smoker,dm neuropathy,anxiety,


chronic back pain, here w/cough/sob...





COPD exacerbation


-symptoms persists


-Continue around-the-clock Annie, Brovana nebulization, IV steroids 


-Pulm consult


-Supplemental oxygen-home o2 arranged.





Acute tracheobronchitis


-Improving on Augmentin plus Zithromax.  


-Cough medications.





DM2


-Stable glycemic trends.  Continue NPH linked with steroid dose.


-Continue basal/bolus regimen.





HTN


-Stable


-on Isordil





Anxiety disorders


-Continue home medications


- PRN Xanax 





Overactive bladder


-Pyridium PRN for dysuria.  


-On Ditropan





Tobacco abuse


-Counseled on cessation.  Provide nicotine patch





Chronic Back pain


-pain control





Obesity with a BMI 32.8


-Weight reduction advised





Constipation


-s/p enema -no BM. Repeat enema nor admisitered


-Repeat enema 


-add mom


-Stool softeners/laxatives





Chronic abdominal wall hernia


-Counseled on weight reduction and outpatient surgical follow-up.





DVT prophylaxis: Lovenox





Disposition: pt still w/symptoms. not feeling well to go home. pulmonary 


consult.





Patient was seen in collaboration with Dr.Rahi.


Results 24hrs





Laboratory Tests


  Test
            7/16/19
12:58  7/16/19
17:55  7/16/19
21:41  7/17/19
08:30


  Bedside Glucose          151             93            109            108








Exam/Review of Systems


Exam


Vitals





Vital Signs


  Date      Temp  Pulse  Resp  B/P (MAP)   Pulse Ox  O2          O2 Flow    FiO2


Time                                                 Delivery    Rate


   7/17/19                                                             3.0


     08:25


   7/17/19           63    18                    95  Nasal


     08:25                                           Cannula


   7/17/19  98.7                   112/56


     07:47                           (74)


   7/14/19 21


     07:48








Intake and Output





7/16/19 7/16/19 7/17/19





1515:00


23:00


07:00





IntakeIntake Total


620 ml





OutputOutput Total


150 ml





BalanceBalance


-150 ml


620 ml














Results


Results 24hrs





Laboratory Tests


  Test
            7/16/19
12:58  7/16/19
17:55  7/16/19
21:41  7/17/19
08:30


  Bedside Glucose          151             93            109            108








Medications


Medication





Current Medications


IV Flush (NS 3 ml) 3 ml PER PROTOCOL IV ;  Start 7/12/19 at 20:30


Ondansetron HCl (Zofran Inj) 4 mg Q6H  PRN IV NAUSEA/VOMITING;  Start 7/12/19 at


20:30


Acetaminophen (Tylenol Tab) 650 mg Q6H  PRN PO .PAIN 1-3 OR TEMP Last 


administered on 7/13/19at 00:25; Admin Dose 650 MG;  Start 7/12/19 at 20:30


Bisacodyl (Dulcolax) 5 mg DAILY  PRN PO .CONSTIPATION Last administered on 


7/17/19at 07:56; Admin Dose 5 MG;  Start 7/12/19 at 20:30


Enoxaparin Sodium (Lovenox) 40 mg DAILY SC  Last administered on 7/17/19at 


09:04; Admin Dose 40 MG;  Start 7/13/19 at 09:00


Albuterol/ Ipratropium (Duoneb) 3 ml Q4H RESP  THERAPY HHN  Last administered on


7/17/19at 08:06; Admin Dose 3 ML;  Start 7/12/19 at 21:00


Gabapentin (Neurontin) 300 mg TID PO  Last administered on 7/17/19at 09:07; 


Admin Dose 300 MG;  Start 7/13/19 at 09:00


Nicotine (Nicoderm 14 Mg/ 24hr) 1 patch DAILY TRANSDERM  Last administered on 


7/17/19at 09:06; Admin Dose 1 PATCH;  Start 7/13/19 at 09:00


Albuterol (Proventil 0.083% (Neb)) 2.5 mg Q2H RESP THERAPY  PRN HHN SHORTNESS OF


BREATH Last administered on 7/16/19at 10:08; Admin Dose 2.5 MG;  Start 7/12/19 


at 23:30


Diagnostic Test (Pha) (Accu-Chek) 1 ea 02 XX ;  Start 7/13/19 at 02:00


Insulin Aspart (Novolog Insulin Pen) NOVOLOG *MILD* ALGORITHM WITH MEALS  


BEDTIME SC  Last administered on 7/16/19at 13:12; Admin Dose 1 UNIT;  Start 


7/13/19 at 08:00


Miscellaneous Information 1 ea NOTE XX ;  Start 7/12/19 at 23:30


Glucose (Glutose) 15 gm Q15M  PRN PO DECREASED GLUCOSE;  Start 7/12/19 at 23:30


Glucose (Glutose) 22.5 gm Q15M  PRN PO DECREASED GLUCOSE;  Start 7/12/19 at 


23:30


Dextrose (D50w Syringe) 25 ml Q15M  PRN IV DECREASED GLUCOSE;  Start 7/12/19 at 


23:30


Dextrose (D50w Syringe) 50 ml Q15M  PRN IV DECREASED GLUCOSE;  Start 7/12/19 at 


23:30


Glucagon (Glucagen) 1 mg Q15M  PRN IM DECREASED GLUCOSE;  Start 7/12/19 at 23:30


Glucose (Glutose) 15 gm Q15M  PRN BUCCAL DECREASED GLUCOSE;  Start 7/12/19 at 


23:30


Zolpidem Tartrate (Ambien) 5 mg HS  PRN PO INSOMNIA Last administered on 


7/15/19at 22:10; Admin Dose 5 MG;  Start 7/12/19 at 23:30


Baclofen (Lioresal) 10 mg DAILY PO  Last administered on 7/17/19at 09:07; Admin 


Dose 10 MG;  Start 7/13/19 at 09:00


Bupropion HCl (Wellbutrin Sr) 150 mg BID PO  Last administered on 7/17/19at 


09:06; Admin Dose 150 MG;  Start 7/13/19 at 09:00


Carisoprodol (Soma) 350 mg Q12 PO  Last administered on 7/17/19at 09:06; Admin 


Dose 350 MG;  Start 7/13/19 at 09:00


Isosorbide Dinitrate (Isordil) 10 mg TID PO  Last administered on 7/17/19at 


09:07; Admin Dose 10 MG;  Start 7/13/19 at 09:00


Oxybutynin Chloride (Ditropan Xl) 10 mg DAILY PO  Last administered on 7/17/19at


09:08; Admin Dose 10 MG;  Start 7/13/19 at 09:00


Tramadol HCl (Ultram) 100 mg Q6H  PRN PO PAIN LEVEL 1-3 Last administered on 


7/16/19at 18:12; Admin Dose 100 MG;  Start 7/13/19 at 04:30


Pantoprazole (Protonix Tab) 40 mg DAILY@06 PO  Last administered on 7/17/19at 


05:44; Admin Dose 40 MG;  Start 7/13/19 at 06:00


Arformoterol Tartrate (Brovana (Neb)) 2 ml BID RESP  THERAPY INH  Last 


administered on 7/17/19at 08:06; Admin Dose 2 ML;  Start 7/13/19 at 20:00


Budesonide (Pulmicort (Neb)) 0.5 mg BID RESP  THERAPY INH  Last administered on 


7/17/19at 08:07; Admin Dose 0.5 MG;  Start 7/13/19 at 20:00


Morphine Sulfate (morphine) 2 mg Q4H  PRN IV SEVERE PAIN LEVEL 7-10 Last 


administered on 7/17/19at 07:56; Admin Dose 2 MG;  Start 7/13/19 at 12:30


Tiotropium Bromide (Spiriva) 1 inh DAILY INH  Last administered on 7/17/19at 


09:05; Admin Dose 1 INH;  Start 7/13/19 at 13:30


Guaifenesin/ Dextromethorphan (Robitussin Dm Liquid Cup) 10 ml Q4H  PRN PO cough


Last administered on 7/17/19at 07:55; Admin Dose 10 ML;  Start 7/13/19 at 12:30


Azithromycin (Zithromax) 500 mg DAILY PO  Last administered on 7/17/19at 09:07; 


Admin Dose 500 MG;  Start 7/14/19 at 12:30;  Stop 7/19/19 at 12:29


Guaifenesin/ Dextromethorphan (Mucinex Dm) 1 tab BID PO  Last administered on 


7/17/19at 09:06; Admin Dose 1 TAB;  Start 7/14/19 at 13:00


Alprazolam (Xanax) 0.25 mg BID  PRN PO anxiety Last administered on 7/14/19at 


14:17; Admin Dose 0.25 MG;  Start 7/14/19 at 12:30


Trazodone HCl (Desyrel) 50 mg HS PO  Last administered on 7/16/19at 21:37; Admin


Dose 50 MG;  Start 7/14/19 at 21:00


Amoxicillin/ Clavulanate Potassium (Augmentin) 875 mg BID PO  Last administered 


on 7/17/19at 09:06; Admin Dose 875 MG;  Start 7/14/19 at 13:00


Tramadol HCl (Ultram) 50 mg TID  PRN PO shoulder pain Last administered on 


7/16/19at 09:10; Admin Dose 50 MG;  Start 7/15/19 at 10:00


Docusate Sodium (Colace) 200 mg BID PO  Last administered on 7/17/19at 09:07; 


Admin Dose 200 MG;  Start 7/15/19 at 10:30


Senna (Senokot) 2 tab BID PO  Last administered on 7/17/19at 09:07; Admin Dose 2


TAB;  Start 7/15/19 at 21:00


Methylprednisolone Sodium Succinate (Solu-Medrol) 40 mg BID IV  Last 


administered on 7/17/19at 09:05; Admin Dose 40 MG;  Start 7/16/19 at 21:00


Psyllium Hydrophilic Mucilloid (Metamucil) 1 pkt BID PO  Last administered on 


7/17/19at 09:05; Admin Dose 1 PKT;  Start 7/16/19 at 21:00


Sodium Biphosphate/ Sodium Phosphate (Fleet Enema) 133 ml DAILY  PRN [place] CONSTI


PATION;  Start 7/16/19 at 12:00


Benzonatate (Tessalon) 100 mg TID PO  Last administered on 7/17/19at 09:06; 


Admin Dose 100 MG;  Start 7/16/19 at 13:00


Insulin Human NPH (Humulin N) 10 unit BID SC  Last administered on 7/17/19at 


09:11; Admin Dose 10 UNIT;  Start 7/16/19 at 21:00











LETICIA MOFFETT NP               Jul 17, 2019 12:06

## 2019-07-18 VITALS — HEART RATE: 80 BPM | DIASTOLIC BLOOD PRESSURE: 65 MMHG | SYSTOLIC BLOOD PRESSURE: 125 MMHG | RESPIRATION RATE: 18 BRPM

## 2019-07-18 VITALS — RESPIRATION RATE: 16 BRPM | HEART RATE: 66 BPM | SYSTOLIC BLOOD PRESSURE: 141 MMHG | DIASTOLIC BLOOD PRESSURE: 67 MMHG

## 2019-07-18 VITALS — SYSTOLIC BLOOD PRESSURE: 130 MMHG | RESPIRATION RATE: 18 BRPM | HEART RATE: 73 BPM | DIASTOLIC BLOOD PRESSURE: 58 MMHG

## 2019-07-18 VITALS — HEART RATE: 74 BPM | DIASTOLIC BLOOD PRESSURE: 64 MMHG | RESPIRATION RATE: 16 BRPM | SYSTOLIC BLOOD PRESSURE: 130 MMHG

## 2019-07-18 RX ADMIN — IPRATROPIUM BROMIDE AND ALBUTEROL SULFATE SCH ML: .5; 3 SOLUTION RESPIRATORY (INHALATION) at 16:37

## 2019-07-18 RX ADMIN — GABAPENTIN SCH MG: 300 CAPSULE ORAL at 20:31

## 2019-07-18 RX ADMIN — CARISOPRODOL SCH MG: 350 TABLET ORAL at 08:45

## 2019-07-18 RX ADMIN — BUDESONIDE SCH MG: 0.5 SUSPENSION RESPIRATORY (INHALATION) at 08:18

## 2019-07-18 RX ADMIN — ISOSORBIDE DINITRATE SCH MG: 10 TABLET ORAL at 12:29

## 2019-07-18 RX ADMIN — CARISOPRODOL SCH MG: 350 TABLET ORAL at 20:32

## 2019-07-18 RX ADMIN — DOCUSATE SODIUM SCH MG: 100 CAPSULE, LIQUID FILLED ORAL at 20:32

## 2019-07-18 RX ADMIN — SENNOSIDES SCH TAB: 8.6 TABLET, FILM COATED ORAL at 20:32

## 2019-07-18 RX ADMIN — MORPHINE SULFATE PRN MG: 2 INJECTION, SOLUTION INTRAMUSCULAR; INTRAVENOUS at 04:17

## 2019-07-18 RX ADMIN — ARFORMOTEROL TARTRATE SCH ML: 15 SOLUTION RESPIRATORY (INHALATION) at 08:18

## 2019-07-18 RX ADMIN — NICOTINE SCH PATCH: 14 PATCH, EXTENDED RELEASE TRANSDERMAL at 08:50

## 2019-07-18 RX ADMIN — ALPRAZOLAM PRN MG: 0.25 TABLET ORAL at 00:57

## 2019-07-18 RX ADMIN — IPRATROPIUM BROMIDE AND ALBUTEROL SULFATE SCH ML: .5; 3 SOLUTION RESPIRATORY (INHALATION) at 20:12

## 2019-07-18 RX ADMIN — LACTULOSE SCH GM: 10 SOLUTION ORAL at 14:10

## 2019-07-18 RX ADMIN — GABAPENTIN SCH MG: 300 CAPSULE ORAL at 12:27

## 2019-07-18 RX ADMIN — OXYBUTYNIN CHLORIDE SCH MG: 5 TABLET, EXTENDED RELEASE ORAL at 08:45

## 2019-07-18 RX ADMIN — IPRATROPIUM BROMIDE AND ALBUTEROL SULFATE SCH ML: .5; 3 SOLUTION RESPIRATORY (INHALATION) at 12:16

## 2019-07-18 RX ADMIN — SENNOSIDES SCH TAB: 8.6 TABLET, FILM COATED ORAL at 08:46

## 2019-07-18 RX ADMIN — GUAIFENESIN AND DEXTROMETHORPHAN HYDROBROMIDE SCH TAB: 600; 30 TABLET, EXTENDED RELEASE ORAL at 08:45

## 2019-07-18 RX ADMIN — BENZONATATE SCH MG: 100 CAPSULE ORAL at 20:32

## 2019-07-18 RX ADMIN — IPRATROPIUM BROMIDE AND ALBUTEROL SULFATE SCH ML: .5; 3 SOLUTION RESPIRATORY (INHALATION) at 00:44

## 2019-07-18 RX ADMIN — PANTOPRAZOLE SODIUM SCH MG: 40 TABLET, DELAYED RELEASE ORAL at 05:42

## 2019-07-18 RX ADMIN — MORPHINE SULFATE PRN MG: 2 INJECTION, SOLUTION INTRAMUSCULAR; INTRAVENOUS at 18:41

## 2019-07-18 RX ADMIN — DOCUSATE SODIUM SCH MG: 100 CAPSULE, LIQUID FILLED ORAL at 08:45

## 2019-07-18 RX ADMIN — GABAPENTIN SCH MG: 300 CAPSULE ORAL at 08:46

## 2019-07-18 RX ADMIN — INSULIN HUMAN SCH UNIT: 100 INJECTION, SUSPENSION SUBCUTANEOUS at 09:00

## 2019-07-18 RX ADMIN — BUPROPION HYDROCHLORIDE SCH MG: 150 TABLET, EXTENDED RELEASE ORAL at 20:32

## 2019-07-18 RX ADMIN — BUPROPION HYDROCHLORIDE SCH MG: 150 TABLET, EXTENDED RELEASE ORAL at 08:46

## 2019-07-18 RX ADMIN — AMOXICILLIN AND CLAVULANATE POTASSIUM SCH MG: 875; 125 TABLET, FILM COATED ORAL at 20:32

## 2019-07-18 RX ADMIN — BACLOFEN SCH MG: 10 TABLET ORAL at 08:46

## 2019-07-18 RX ADMIN — MORPHINE SULFATE PRN MG: 2 INJECTION, SOLUTION INTRAMUSCULAR; INTRAVENOUS at 10:41

## 2019-07-18 RX ADMIN — ENOXAPARIN SODIUM SCH MG: 100 INJECTION SUBCUTANEOUS at 08:48

## 2019-07-18 RX ADMIN — LUBIPROSTONE SCH MCG: 24 CAPSULE, GELATIN COATED ORAL at 20:32

## 2019-07-18 RX ADMIN — BENZONATATE SCH MG: 100 CAPSULE ORAL at 08:46

## 2019-07-18 RX ADMIN — IPRATROPIUM BROMIDE AND ALBUTEROL SULFATE SCH ML: .5; 3 SOLUTION RESPIRATORY (INHALATION) at 04:21

## 2019-07-18 RX ADMIN — ISOSORBIDE DINITRATE SCH MG: 10 TABLET ORAL at 20:32

## 2019-07-18 RX ADMIN — BENZONATATE SCH MG: 100 CAPSULE ORAL at 12:28

## 2019-07-18 RX ADMIN — LACTULOSE SCH GM: 10 SOLUTION ORAL at 20:31

## 2019-07-18 RX ADMIN — INSULIN HUMAN SCH UNIT: 100 INJECTION, SUSPENSION SUBCUTANEOUS at 20:22

## 2019-07-18 RX ADMIN — ARFORMOTEROL TARTRATE SCH ML: 15 SOLUTION RESPIRATORY (INHALATION) at 20:12

## 2019-07-18 RX ADMIN — GUAIFENESIN AND DEXTROMETHORPHAN HYDROBROMIDE SCH TAB: 600; 30 TABLET, EXTENDED RELEASE ORAL at 20:32

## 2019-07-18 RX ADMIN — BUDESONIDE SCH MG: 0.5 SUSPENSION RESPIRATORY (INHALATION) at 20:12

## 2019-07-18 RX ADMIN — BISACODYL PRN MG: 5 TABLET, COATED ORAL at 13:18

## 2019-07-18 RX ADMIN — AMOXICILLIN AND CLAVULANATE POTASSIUM SCH MG: 875; 125 TABLET, FILM COATED ORAL at 08:46

## 2019-07-18 RX ADMIN — AZITHROMYCIN SCH MG: 500 TABLET, FILM COATED ORAL at 08:46

## 2019-07-18 RX ADMIN — IPRATROPIUM BROMIDE AND ALBUTEROL SULFATE SCH ML: .5; 3 SOLUTION RESPIRATORY (INHALATION) at 08:17

## 2019-07-18 RX ADMIN — ISOSORBIDE DINITRATE SCH MG: 10 TABLET ORAL at 08:46

## 2019-07-18 NOTE — PN
Date/Time of Note


Date/Time of Note


DATE: 7/18/19 


TIME: 13:01





Assessment/Plan


VTE Prophylaxis


Risk score (from Nsg)>0 risk:  3


SCD applied (from Nsg):  Yes


Pharmacological prophylaxis:  LMWH





Lines/Catheters


IV Catheter Type (from Nrsg):  Peripheral IV


Urinary Cath still in place:  No





Assessment/Plan


Hospital Course


SUBJECTIVE: Has not had a bowel movement yet.  She continued to have wheezing 


but improved.


OBJECTIVE: Vital signs-see below








PHYSICAL EXAM:


Constitutional: Adequately built,not in acute distress.


HEENT: Head atraumatic and normocephalic. Eyes: Extraocular muscles  intact. 


Anicteric sclerae. Pupils equal bilaterally, reactive to light.


NECK: Supple without lymph node.


CHEST: Expiratory wheezing bilaterally-improving. 


HEART: S1, S2. Regular rate and rhythm.


ABDOMEN:+Chronic Hernia. Slightly distended, no tenderness.  Bowel sounds were 


present.


EXTREMITIES:  No cyanosis, clubbing or edema.


NEUROLOGIC: Alert and oriented x3. No focal deficit. No sensory deficit.


PSYCHOSOCIAL: No signs of depression.


INTEGUMENTARY: No open wounds.





ASSESSMENT AND PLAN:63 yo w/copd,current every day smoker,dm neuropathy,anxiety,


chronic back pain, here w/cough/sob...





COPD exacerbation


-symptoms persists


-Continue around-the-clock Annie, Brovana nebulization, IV steroids 


-Pulm consult


-Supplemental oxygen-home o2 arranged.





Acute tracheobronchitis


-Improving on Augmentin plus Zithromax.  


-Cough medications.





DM2


-Stable glycemic trends.  Continue NPH linked with steroid dose.


-Continue basal/bolus regimen.





HTN


-Stable


-on Isordil





Anxiety disorders


-Continue home medications


- PRN Xanax 





Overactive bladder


-Pyridium PRN for dysuria.  


-On Ditropan





Tobacco abuse


-Counseled on cessation.  Provide nicotine patch





Chronic Back pain


-pain control





Obesity with a BMI 32.8


-Weight reduction advised





Constipation


-no BMs w/ multiple enemas and laxatives trial.


-GI consult





Chronic abdominal wall hernia


-Counseled on weight reduction and outpatient surgical follow-up.





Anxiety disorders


-PRN Benzo





DVT prophylaxis: Lovenox





Disposition: Continue current management.  DC planning when clinically stable.





Patient was seen in collaboration with .


Results 24hrs





Laboratory Tests


  Test
            7/17/19
17:38  7/17/19
20:25  7/18/19
07:59  7/18/19
12:02


  Bedside Glucose          151            165            120            112








Exam/Review of Systems


Exam


Vitals





Vital Signs


  Date      Temp  Pulse  Resp  B/P (MAP)   Pulse Ox  O2          O2 Flow    FiO2


Time                                                 Delivery    Rate


   7/18/19           79    22                    94  Nasal             3.0


     12:17                                           Cannula


   7/18/19  97.5                   141/67


     07:59                           (91)


   7/14/19                                                                    21


     07:48








Intake and Output





7/17/19 7/17/19 7/18/19





1515:00


23:00


07:00





IntakeIntake Total


840 ml


500 ml


700 ml





OutputOutput Total


1200 ml





BalanceBalance


840 ml


500 ml


-500 ml














Results


Results 24hrs





Laboratory Tests


  Test
            7/17/19
17:38  7/17/19
20:25  7/18/19
07:59  7/18/19
12:02


  Bedside Glucose          151            165            120            112








Medications


Medication





Current Medications


IV Flush (NS 3 ml) 3 ml PER PROTOCOL IV ;  Start 7/12/19 at 20:30


Ondansetron HCl (Zofran Inj) 4 mg Q6H  PRN IV NAUSEA/VOMITING;  Start 7/12/19 at


20:30


Acetaminophen (Tylenol Tab) 650 mg Q6H  PRN PO .PAIN 1-3 OR TEMP Last 


administered on 7/13/19at 00:25; Admin Dose 650 MG;  Start 7/12/19 at 20:30


Bisacodyl (Dulcolax) 5 mg DAILY  PRN PO .CONSTIPATION Last administered on 


7/17/19at 07:56; Admin Dose 5 MG;  Start 7/12/19 at 20:30


Enoxaparin Sodium (Lovenox) 40 mg DAILY SC  Last administered on 7/18/19at 


08:48; Admin Dose 40 MG;  Start 7/13/19 at 09:00


Albuterol/ Ipratropium (Duoneb) 3 ml Q4H RESP  THERAPY HHN  Last administered on


7/18/19at 12:16; Admin Dose 3 ML;  Start 7/12/19 at 21:00


Gabapentin (Neurontin) 300 mg TID PO  Last administered on 7/18/19at 12:27; 


Admin Dose 300 MG;  Start 7/13/19 at 09:00


Nicotine (Nicoderm 14 Mg/ 24hr) 1 patch DAILY TRANSDERM  Last administered on 


7/18/19at 08:50; Admin Dose 1 PATCH;  Start 7/13/19 at 09:00


Albuterol (Proventil 0.083% (Neb)) 2.5 mg Q2H RESP THERAPY  PRN HHN SHORTNESS OF


BREATH Last administered on 7/16/19at 10:08; Admin Dose 2.5 MG;  Start 7/12/19 


at 23:30


Diagnostic Test (Pha) (Accu-Chek) 1 ea 02 XX ;  Start 7/13/19 at 02:00


Insulin Aspart (Novolog Insulin Pen) NOVOLOG *MILD* ALGORITHM WITH MEALS  


BEDTIME SC  Last administered on 7/17/19at 17:46; Admin Dose 1 UNIT;  Start 


7/13/19 at 08:00


Miscellaneous Information 1 ea NOTE XX ;  Start 7/12/19 at 23:30


Glucose (Glutose) 15 gm Q15M  PRN PO DECREASED GLUCOSE;  Start 7/12/19 at 23:30


Glucose (Glutose) 22.5 gm Q15M  PRN PO DECREASED GLUCOSE;  Start 7/12/19 at 


23:30


Dextrose (D50w Syringe) 25 ml Q15M  PRN IV DECREASED GLUCOSE;  Start 7/12/19 at 


23:30


Dextrose (D50w Syringe) 50 ml Q15M  PRN IV DECREASED GLUCOSE;  Start 7/12/19 at 


23:30


Glucagon (Glucagen) 1 mg Q15M  PRN IM DECREASED GLUCOSE;  Start 7/12/19 at 23:30


Glucose (Glutose) 15 gm Q15M  PRN BUCCAL DECREASED GLUCOSE;  Start 7/12/19 at 


23:30


Zolpidem Tartrate (Ambien) 5 mg HS  PRN PO INSOMNIA Last administered on 


7/17/19at 20:57; Admin Dose 5 MG;  Start 7/12/19 at 23:30


Baclofen (Lioresal) 10 mg DAILY PO  Last administered on 7/18/19at 08:46; Admin 


Dose 10 MG;  Start 7/13/19 at 09:00


Bupropion HCl (Wellbutrin Sr) 150 mg BID PO  Last administered on 7/18/19at 


08:46; Admin Dose 150 MG;  Start 7/13/19 at 09:00


Carisoprodol (Soma) 350 mg Q12 PO  Last administered on 7/18/19at 08:45; Admin 


Dose 350 MG;  Start 7/13/19 at 09:00


Isosorbide Dinitrate (Isordil) 10 mg TID PO  Last administered on 7/18/19at 


12:29; Admin Dose 10 MG;  Start 7/13/19 at 09:00


Oxybutynin Chloride (Ditropan Xl) 10 mg DAILY PO  Last administered on 7/18/19at


08:45; Admin Dose 10 MG;  Start 7/13/19 at 09:00


Tramadol HCl (Ultram) 100 mg Q6H  PRN PO PAIN LEVEL 1-3 Last administered on 


7/17/19at 12:48; Admin Dose 100 MG;  Start 7/13/19 at 04:30


Pantoprazole (Protonix Tab) 40 mg DAILY@06 PO  Last administered on 7/18/19at 


05:42; Admin Dose 40 MG;  Start 7/13/19 at 06:00


Arformoterol Tartrate (Brovana (Neb)) 2 ml BID RESP  THERAPY INH  Last 


administered on 7/18/19at 08:18; Admin Dose 2 ML;  Start 7/13/19 at 20:00


Budesonide (Pulmicort (Neb)) 0.5 mg BID RESP  THERAPY INH  Last administered on 


7/18/19at 08:18; Admin Dose 0.5 MG;  Start 7/13/19 at 20:00


Morphine Sulfate (morphine) 2 mg Q4H  PRN IV SEVERE PAIN LEVEL 7-10 Last 


administered on 7/18/19at 10:41; Admin Dose 2 MG;  Start 7/13/19 at 12:30


Tiotropium Bromide (Spiriva) 1 inh DAILY INH  Last administered on 7/18/19at 


10:40; Admin Dose 1 INH;  Start 7/13/19 at 13:30


Guaifenesin/ Dextromethorphan (Robitussin Dm Liquid Cup) 10 ml Q4H  PRN PO cough


Last administered on 7/18/19at 10:40; Admin Dose 10 ML;  Start 7/13/19 at 12:30


Azithromycin (Zithromax) 500 mg DAILY PO  Last administered on 7/18/19at 08:46; 


Admin Dose 500 MG;  Start 7/14/19 at 12:30;  Stop 7/19/19 at 12:29


Guaifenesin/ Dextromethorphan (Mucinex Dm) 1 tab BID PO  Last administered on 


7/18/19at 08:45; Admin Dose 1 TAB;  Start 7/14/19 at 13:00


Alprazolam (Xanax) 0.25 mg BID  PRN PO anxiety Last administered on 7/18/19at 


00:57; Admin Dose 0.25 MG;  Start 7/14/19 at 12:30


Trazodone HCl (Desyrel) 50 mg HS PO  Last administered on 7/17/19at 20:44; Admin


Dose 50 MG;  Start 7/14/19 at 21:00


Amoxicillin/ Clavulanate Potassium (Augmentin) 875 mg BID PO  Last administered 


on 7/18/19at 08:46; Admin Dose 875 MG;  Start 7/14/19 at 13:00


Tramadol HCl (Ultram) 50 mg TID  PRN PO shoulder pain Last administered on 


7/16/19at 09:10; Admin Dose 50 MG;  Start 7/15/19 at 10:00


Docusate Sodium (Colace) 200 mg BID PO  Last administered on 7/18/19at 08:45; 


Admin Dose 200 MG;  Start 7/15/19 at 10:30


Senna (Senokot) 2 tab BID PO  Last administered on 7/18/19at 08:46; Admin Dose 2


TAB;  Start 7/15/19 at 21:00


Methylprednisolone Sodium Succinate (Solu-Medrol) 40 mg BID IV  Last 


administered on 7/18/19at 08:45; Admin Dose 40 MG;  Start 7/16/19 at 21:00


Psyllium Hydrophilic Mucilloid (Metamucil) 1 pkt BID PO  Last administered on 


7/18/19at 08:44; Admin Dose 1 PKT;  Start 7/16/19 at 21:00


Sodium Biphosphate/ Sodium Phosphate (Fleet Enema) 133 ml DAILY  PRN NE 


CONSTIPATION;  Start 7/16/19 at 12:00


Benzonatate (Tessalon) 100 mg TID PO  Last administered on 7/18/19at 12:28; 


Admin Dose 100 MG;  Start 7/16/19 at 13:00


Insulin Human NPH (Humulin N) 10 unit BID SC  Last administered on 7/17/19at 


20:41; Admin Dose 10 UNIT;  Start 7/16/19 at 21:00


Magnesium Hydroxide (Milk Of Mag) 30 ml DAILY  PRN PO CONSTIPATION;  Start 


7/17/19 at 12:30











LETICIA MOFFETT NP               Jul 18, 2019 13:03

## 2019-07-18 NOTE — CONS
Assessment/Plan


Assessment/Plan


Hospital Course (Demo Recall)


Summary Assessment and Plan:


Assessment:


Constipation


COPD exacerbation


DM2


HTN


Chronic Back pain


Obesity with a BMI 32.8


Abdominal wall hernia


Anxiety/depression





Plan:


Lactulose 20 g p.o. twice daily D


Tap water enema x1


We will start Amitiza BID


Further recommendations based on clinical course


Patient seen in collaboration with Dr. Huynh


CC:   CHETNA HUYNH MD ;





Consultation Date/Type/Reason


Admit Date/Time





Date of Consultation:  Jul 18, 2019


Type of Consult


GI


Reason for Consultation


Constipation


Date/Time of Note


DATE: 7/18/19 


TIME: 13:31





Hx of Present Illness


This is a 64-year-old female with past medical history of diabetes,COPD, 


hypertension, anxiety, depression, chronic back pain chronic use.  Who was 


admitted for acute on chronic COPD exacerbation during hospitalization patient 


has not been able to have a bowel movement for the past several days.  She notes


that she has history of chronic constipation usually takes MiraLAX at home with 


good results.  However since hospitalization she has not been able to ambulate 


as much as possible secondary to COPD exacerbation and she is been on increased 


pain medication for chronic pain.  Enemas have been ordered with minimal relief 


patient states she is able to pass gas yesterday with one very small stool she 


felt this was incomplete evacuation.  She currently denies nausea/vomiting, 


hematemesis, abdominal pain, melena, hematochezia, or diarrhea.  Discussed plan 


to start lactulose twice daily with x1 tapwater enema patient verbalized 


understanding and is agreeable.  We will additionally start Amitiza for chronic 


constipation


Review of Systems: 


A 12 system, review was conducted and is negative except as noted in the HPI or 


here.





Past Medical History


Home Meds


Active Scripts


Docusate Sodium* (Colace*) 100 Mg Capsule, 100 MG PO TID, #30 CAP


   Prov:CHIKIS CHANDLER.         2/7/19


Isosorbide Dinitrate* (Isordil*) 10 Mg Tablet, 10 MG PO TID, #90 TAB 4 Refills


   Prov:PETAR MARIE S.         11/22/16


Salmeterol Xinaf/Fluticasone* (Advair*) 250-50 Diskus Inhaler, 1 INH INH BID, #1


4 Refills


   Prov:DRE MARIEP S.         11/22/16


Polyethylene Glycol* (Miralax*) 17 Gm Powd.pack, 17 GM PO DAILY, #30


   Prov:DRE MARIEP S.         11/22/16


Phenazopyridine Hcl* (Phenazopyridine Hcl*) 100 Mg Tablet, 100 MG PO TID PRN for


dysuria, #60 TAB


   Prov:DRE MARIEP S.         11/22/16


Tramadol HCl (Tramadol HCl) 50 Mg Tablet, 100 MG PO Q6H, #120 TAB


   Prov:DRE MARIEP S.         11/22/16


Reported Medications


Oxycodone Hcl* (Oxycontin*) 40 Mg Tab.er.12h, 40 MG PO Q8, TAB


   7/13/19


Baclofen* (Baclofen*) 10 Mg Tablet, 10 MG PO DAILY, TAB


   7/13/19


Carisoprodol* (Carisoprodol*) 350 Mg Tablet, 350 MG PO Q12, TAB


   7/13/19


Gabapentin* (Neurontin*) 300 Mg Capsule, 300 MG PO TID for Neuropathy  MDD 900mg


, #90 CAP


   7/12/19


Temazepam* (Temazepam*) 15 Mg Capsule, 15 MG PO HS PRN for INSOMNIA, CAP


   7/12/19


Oxybutynin Chloride* (Ditropan* XL) 10 Mg Tab.er.24, 10 MG PO DAILY, TAB.SA


   11/11/16


Bupropion Hcl* (Wellbutrin SR*) 150 Mg Tablet.sa, 150 MG PO BID, TAB.SA


   11/11/16


Metformin Hcl (Metformin Hcl ER) 500 Mg Tab.er.24h, 500 MG PO DAILY, #30 TAB.SA


   11/11/16


Discontinued Reported Medications


Paroxetine Hcl* (Paxil*) 20 Mg Tablet, 20 MG PO DAILY, TAB


   11/11/16


Modafinil* (Modafinil*) 200 Mg Tablet, 200 MG PO DAILY, TAB


   11/11/16


Discontinued Scripts


Benzonatate* (Benzonatate*) 100 Mg Capsule, 100 MG PO TID PRN for cough, #90 CAP


   Prov:DRE MARIEP S.         11/22/16


Benazepril Hcl* (Benazepril Hcl*) 10 Mg Tablet, 10 MG PO BID, #60 TAB 4 Refills


   Prov:RAPETAR AGRAWAL S.         11/22/16


Carvedilol* (Carvedilol*) 6.25 Mg Tablet, 6.25 MG PO BID, #60 TAB 4 Refills


   Prov:PETAR MARIE S.         11/22/16


Nicotine* (Nicotine* Patch) 7 mg/day Patch, 1 PATCH TRANSDERM DAILY, #30 2 


Refills


   Prov:PETAR MARIE S.         11/22/16


Tiotropium Bromide* (Spiriva*) 18 Mcg Cap.w.dev, 1 INH INH DAILY, #1 3 Refills


   Prov:PETAR MARIE S.         11/22/16


Medications





Current Medications


IV Flush (NS 3 ml) 3 ml PER PROTOCOL IV ;  Start 7/12/19 at 20:30


Ondansetron HCl (Zofran Inj) 4 mg Q6H  PRN IV NAUSEA/VOMITING;  Start 7/12/19 at


20:30


Acetaminophen (Tylenol Tab) 650 mg Q6H  PRN PO .PAIN 1-3 OR TEMP Last 


administered on 7/13/19at 00:25; Admin Dose 650 MG;  Start 7/12/19 at 20:30


Bisacodyl (Dulcolax) 5 mg DAILY  PRN PO .CONSTIPATION Last administered on 


7/18/19at 13:18; Admin Dose 5 MG;  Start 7/12/19 at 20:30


Enoxaparin Sodium (Lovenox) 40 mg DAILY SC  Last administered on 7/18/19at 


08:48; Admin Dose 40 MG;  Start 7/13/19 at 09:00


Albuterol/ Ipratropium (Duoneb) 3 ml Q4H RESP  THERAPY HHN  Last administered on


7/18/19at 12:16; Admin Dose 3 ML;  Start 7/12/19 at 21:00


Gabapentin (Neurontin) 300 mg TID PO  Last administered on 7/18/19at 12:27; 


Admin Dose 300 MG;  Start 7/13/19 at 09:00


Nicotine (Nicoderm 14 Mg/ 24hr) 1 patch DAILY TRANSDERM  Last administered on 


7/18/19at 08:50; Admin Dose 1 PATCH;  Start 7/13/19 at 09:00


Albuterol (Proventil 0.083% (Neb)) 2.5 mg Q2H RESP THERAPY  PRN HHN SHORTNESS OF


BREATH Last administered on 7/16/19at 10:08; Admin Dose 2.5 MG;  Start 7/12/19 


at 23:30


Diagnostic Test (Pha) (Accu-Chek) 1 ea 02 XX ;  Start 7/13/19 at 02:00


Insulin Aspart (Novolog Insulin Pen) NOVOLOG *MILD* ALGORITHM WITH MEALS  


BEDTIME SC  Last administered on 7/17/19at 17:46; Admin Dose 1 UNIT;  Start 


7/13/19 at 08:00


Miscellaneous Information 1 ea NOTE XX ;  Start 7/12/19 at 23:30


Glucose (Glutose) 15 gm Q15M  PRN PO DECREASED GLUCOSE;  Start 7/12/19 at 23:30


Glucose (Glutose) 22.5 gm Q15M  PRN PO DECREASED GLUCOSE;  Start 7/12/19 at 


23:30


Dextrose (D50w Syringe) 25 ml Q15M  PRN IV DECREASED GLUCOSE;  Start 7/12/19 at 


23:30


Dextrose (D50w Syringe) 50 ml Q15M  PRN IV DECREASED GLUCOSE;  Start 7/12/19 at 


23:30


Glucagon (Glucagen) 1 mg Q15M  PRN IM DECREASED GLUCOSE;  Start 7/12/19 at 23:30


Glucose (Glutose) 15 gm Q15M  PRN BUCCAL DECREASED GLUCOSE;  Start 7/12/19 at 


23:30


Baclofen (Lioresal) 10 mg DAILY PO  Last administered on 7/18/19at 08:46; Admin 


Dose 10 MG;  Start 7/13/19 at 09:00


Bupropion HCl (Wellbutrin Sr) 150 mg BID PO  Last administered on 7/18/19at 0


8:46; Admin Dose 150 MG;  Start 7/13/19 at 09:00


Carisoprodol (Soma) 350 mg Q12 PO  Last administered on 7/18/19at 08:45; Admin 


Dose 350 MG;  Start 7/13/19 at 09:00


Isosorbide Dinitrate (Isordil) 10 mg TID PO  Last administered on 7/18/19at 


12:29; Admin Dose 10 MG;  Start 7/13/19 at 09:00


Oxybutynin Chloride (Ditropan Xl) 10 mg DAILY PO  Last administered on 7/18/19at


08:45; Admin Dose 10 MG;  Start 7/13/19 at 09:00


Tramadol HCl (Ultram) 100 mg Q6H  PRN PO PAIN LEVEL 1-3 Last administered on 


7/17/19at 12:48; Admin Dose 100 MG;  Start 7/13/19 at 04:30


Pantoprazole (Protonix Tab) 40 mg DAILY@06 PO  Last administered on 7/18/19at 


05:42; Admin Dose 40 MG;  Start 7/13/19 at 06:00


Arformoterol Tartrate (Brovana (Neb)) 2 ml BID RESP  THERAPY INH  Last 


administered on 7/18/19at 08:18; Admin Dose 2 ML;  Start 7/13/19 at 20:00


Budesonide (Pulmicort (Neb)) 0.5 mg BID RESP  THERAPY INH  Last administered on 


7/18/19at 08:18; Admin Dose 0.5 MG;  Start 7/13/19 at 20:00


Morphine Sulfate (morphine) 2 mg Q4H  PRN IV SEVERE PAIN LEVEL 7-10 Last 


administered on 7/18/19at 10:41; Admin Dose 2 MG;  Start 7/13/19 at 12:30


Tiotropium Bromide (Spiriva) 1 inh DAILY INH  Last administered on 7/18/19at 


10:40; Admin Dose 1 INH;  Start 7/13/19 at 13:30


Guaifenesin/ Dextromethorphan (Robitussin Dm Liquid Cup) 10 ml Q4H  PRN PO cough


Last administered on 7/18/19at 10:40; Admin Dose 10 ML;  Start 7/13/19 at 12:30


Azithromycin (Zithromax) 500 mg DAILY PO  Last administered on 7/18/19at 08:46; 


Admin Dose 500 MG;  Start 7/14/19 at 12:30;  Stop 7/19/19 at 12:29


Guaifenesin/ Dextromethorphan (Mucinex Dm) 1 tab BID PO  Last administered on 


7/18/19at 08:45; Admin Dose 1 TAB;  Start 7/14/19 at 13:00


Alprazolam (Xanax) 0.25 mg BID  PRN PO anxiety Last administered on 7/18/19at 


00:57; Admin Dose 0.25 MG;  Start 7/14/19 at 12:30


Trazodone HCl (Desyrel) 50 mg HS PO  Last administered on 7/17/19at 20:44; Admin


Dose 50 MG;  Start 7/14/19 at 21:00


Amoxicillin/ Clavulanate Potassium (Augmentin) 875 mg BID PO  Last administered 


on 7/18/19at 08:46; Admin Dose 875 MG;  Start 7/14/19 at 13:00


Tramadol HCl (Ultram) 50 mg TID  PRN PO shoulder pain Last administered on 


7/16/19at 09:10; Admin Dose 50 MG;  Start 7/15/19 at 10:00


Docusate Sodium (Colace) 200 mg BID PO  Last administered on 7/18/19at 08:45; 


Admin Dose 200 MG;  Start 7/15/19 at 10:30


Senna (Senokot) 2 tab BID PO  Last administered on 7/18/19at 08:46; Admin Dose 2


TAB;  Start 7/15/19 at 21:00


Methylprednisolone Sodium Succinate (Solu-Medrol) 40 mg BID IV  Last 


administered on 7/18/19at 08:45; Admin Dose 40 MG;  Start 7/16/19 at 21:00


Psyllium Hydrophilic Mucilloid (Metamucil) 1 pkt BID PO  Last administered on 


7/18/19at 08:44; Admin Dose 1 PKT;  Start 7/16/19 at 21:00


Sodium Biphosphate/ Sodium Phosphate (Fleet Enema) 133 ml DAILY  PRN MA 


CONSTIPATION;  Start 7/16/19 at 12:00


Benzonatate (Tessalon) 100 mg TID PO  Last administered on 7/18/19at 12:28; 


Admin Dose 100 MG;  Start 7/16/19 at 13:00


Insulin Human NPH (Humulin N) 10 unit BID SC  Last administered on 7/17/19at 


20:41; Admin Dose 10 UNIT;  Start 7/16/19 at 21:00


Magnesium Hydroxide (Milk Of Mag) 30 ml DAILY  PRN PO CONSTIPATION Last 


administered on 7/18/19at 13:18; Admin Dose 30 ML;  Start 7/17/19 at 12:30


Lactulose (Enulose) 20 gm BID PO ;  Start 7/18/19 at 13:30;  Status UNV


Allergies:  


Coded Allergies:  


     No Known Allergy (Verified , 8/8/13)





Social History


Alcohol Use:  none


Smoking Status:  Current every day smoker


Drug Use:  none





Exam/Review of Systems


Exam


Vitals





Vital Signs


  Date      Temp  Pulse  Resp  B/P (MAP)   Pulse Ox  O2          O2 Flow    FiO2


Time                                                 Delivery    Rate


   7/18/19           79    22                    94  Nasal             3.0


     12:17                                           Cannula


   7/18/19  97.5                   141/67


     07:59                           (91)


   7/14/19                                                                    21


     07:48








Intake and Output





7/17/19 7/17/19 7/18/19





1515:00


23:00


07:00





IntakeIntake Total


840 ml


500 ml


700 ml





OutputOutput Total


1200 ml





BalanceBalance


840 ml


500 ml


-500 ml











Constitutional:  alert, oriented


Psych:  no complaints


Head:  normocephalic


Eyes:  nl conjunctiva, EOMI


ENMT:  nl external ears & nose, nl lips & teeth


Neck:  supple, non-tender


Respiratory:  crackles/rales, other (Oxygen nasal cannula in place)


Cardiovascular:  regular rate and rhythm


Gastrointestinal:  soft, bowel sounds, other (obese, ventral hernia)





Results


Results 24hrs





Laboratory Tests


  Test
            7/17/19
17:38  7/17/19
20:25  7/18/19
07:59  7/18/19
12:02


  Bedside Glucose          151            165            120            112








Medications


Medication





Current Medications


IV Flush (NS 3 ml) 3 ml PER PROTOCOL IV ;  Start 7/12/19 at 20:30


Ondansetron HCl (Zofran Inj) 4 mg Q6H  PRN IV NAUSEA/VOMITING;  Start 7/12/19 at


20:30


Acetaminophen (Tylenol Tab) 650 mg Q6H  PRN PO .PAIN 1-3 OR TEMP Last 


administered on 7/13/19at 00:25; Admin Dose 650 MG;  Start 7/12/19 at 20:30


Bisacodyl (Dulcolax) 5 mg DAILY  PRN PO .CONSTIPATION Last administered on 


7/18/19at 13:18; Admin Dose 5 MG;  Start 7/12/19 at 20:30


Enoxaparin Sodium (Lovenox) 40 mg DAILY SC  Last administered on 7/18/19at 


08:48; Admin Dose 40 MG;  Start 7/13/19 at 09:00


Albuterol/ Ipratropium (Duoneb) 3 ml Q4H RESP  THERAPY HHN  Last administered on


7/18/19at 12:16; Admin Dose 3 ML;  Start 7/12/19 at 21:00


Gabapentin (Neurontin) 300 mg TID PO  Last administered on 7/18/19at 12:27; 


Admin Dose 300 MG;  Start 7/13/19 at 09:00


Nicotine (Nicoderm 14 Mg/ 24hr) 1 patch DAILY TRANSDERM  Last administered on 


7/18/19at 08:50; Admin Dose 1 PATCH;  Start 7/13/19 at 09:00


Albuterol (Proventil 0.083% (Neb)) 2.5 mg Q2H RESP THERAPY  PRN HHN SHORTNESS OF


BREATH Last administered on 7/16/19at 10:08; Admin Dose 2.5 MG;  Start 7/12/19 


at 23:30


Diagnostic Test (Pha) (Accu-Chek) 1 ea 02 XX ;  Start 7/13/19 at 02:00


Insulin Aspart (Novolog Insulin Pen) NOVOLOG *MILD* ALGORITHM WITH MEALS  


BEDTIME SC  Last administered on 7/17/19at 17:46; Admin Dose 1 UNIT;  Start 


7/13/19 at 08:00


Miscellaneous Information 1 ea NOTE XX ;  Start 7/12/19 at 23:30


Glucose (Glutose) 15 gm Q15M  PRN PO DECREASED GLUCOSE;  Start 7/12/19 at 23:30


Glucose (Glutose) 22.5 gm Q15M  PRN PO DECREASED GLUCOSE;  Start 7/12/19 at 


23:30


Dextrose (D50w Syringe) 25 ml Q15M  PRN IV DECREASED GLUCOSE;  Start 7/12/19 at 


23:30


Dextrose (D50w Syringe) 50 ml Q15M  PRN IV DECREASED GLUCOSE;  Start 7/12/19 at 


23:30


Glucagon (Glucagen) 1 mg Q15M  PRN IM DECREASED GLUCOSE;  Start 7/12/19 at 23:30


Glucose (Glutose) 15 gm Q15M  PRN BUCCAL DECREASED GLUCOSE;  Start 7/12/19 at 


23:30


Baclofen (Lioresal) 10 mg DAILY PO  Last administered on 7/18/19at 08:46; Admin 


Dose 10 MG;  Start 7/13/19 at 09:00


Bupropion HCl (Wellbutrin Sr) 150 mg BID PO  Last administered on 7/18/19at 


08:46; Admin Dose 150 MG;  Start 7/13/19 at 09:00


Carisoprodol (Soma) 350 mg Q12 PO  Last administered on 7/18/19at 08:45; Admin 


Dose 350 MG;  Start 7/13/19 at 09:00


Isosorbide Dinitrate (Isordil) 10 mg TID PO  Last administered on 7/18/19at 


12:29; Admin Dose 10 MG;  Start 7/13/19 at 09:00


Oxybutynin Chloride (Ditropan Xl) 10 mg DAILY PO  Last administered on 7/18/19at


08:45; Admin Dose 10 MG;  Start 7/13/19 at 09:00


Tramadol HCl (Ultram) 100 mg Q6H  PRN PO PAIN LEVEL 1-3 Last administered on 


7/17/19at 12:48; Admin Dose 100 MG;  Start 7/13/19 at 04:30


Pantoprazole (Protonix Tab) 40 mg DAILY@06 PO  Last administered on 7/18/19at 


05:42; Admin Dose 40 MG;  Start 7/13/19 at 06:00


Arformoterol Tartrate (Brovana (Neb)) 2 ml BID RESP  THERAPY INH  Last 


administered on 7/18/19at 08:18; Admin Dose 2 ML;  Start 7/13/19 at 20:00


Budesonide (Pulmicort (Neb)) 0.5 mg BID RESP  THERAPY INH  Last administered on 


7/18/19at 08:18; Admin Dose 0.5 MG;  Start 7/13/19 at 20:00


Morphine Sulfate (morphine) 2 mg Q4H  PRN IV SEVERE PAIN LEVEL 7-10 Last 


administered on 7/18/19at 10:41; Admin Dose 2 MG;  Start 7/13/19 at 12:30


Tiotropium Bromide (Spiriva) 1 inh DAILY INH  Last administered on 7/18/19at 


10:40; Admin Dose 1 INH;  Start 7/13/19 at 13:30


Guaifenesin/ Dextromethorphan (Robitussin Dm Liquid Cup) 10 ml Q4H  PRN PO cough


Last administered on 7/18/19at 10:40; Admin Dose 10 ML;  Start 7/13/19 at 12:30


Azithromycin (Zithromax) 500 mg DAILY PO  Last administered on 7/18/19at 08:46; 


Admin Dose 500 MG;  Start 7/14/19 at 12:30;  Stop 7/19/19 at 12:29


Guaifenesin/ Dextromethorphan (Mucinex Dm) 1 tab BID PO  Last administered on 


7/18/19at 08:45; Admin Dose 1 TAB;  Start 7/14/19 at 13:00


Alprazolam (Xanax) 0.25 mg BID  PRN PO anxiety Last administered on 7/18/19at 


00:57; Admin Dose 0.25 MG;  Start 7/14/19 at 12:30


Trazodone HCl (Desyrel) 50 mg HS PO  Last administered on 7/17/19at 20:44; Admin


Dose 50 MG;  Start 7/14/19 at 21:00


Amoxicillin/ Clavulanate Potassium (Augmentin) 875 mg BID PO  Last administered 


on 7/18/19at 08:46; Admin Dose 875 MG;  Start 7/14/19 at 13:00


Tramadol HCl (Ultram) 50 mg TID  PRN PO shoulder pain Last administered on 


7/16/19at 09:10; Admin Dose 50 MG;  Start 7/15/19 at 10:00


Docusate Sodium (Colace) 200 mg BID PO  Last administered on 7/18/19at 08:45; 


Admin Dose 200 MG;  Start 7/15/19 at 10:30


Senna (Senokot) 2 tab BID PO  Last administered on 7/18/19at 08:46; Admin Dose 2


TAB;  Start 7/15/19 at 21:00


Methylprednisolone Sodium Succinate (Solu-Medrol) 40 mg BID IV  Last 


administered on 7/18/19at 08:45; Admin Dose 40 MG;  Start 7/16/19 at 21:00


Psyllium Hydrophilic Mucilloid (Metamucil) 1 pkt BID PO  Last administered on 


7/18/19at 08:44; Admin Dose 1 PKT;  Start 7/16/19 at 21:00


Sodium Biphosphate/ Sodium Phosphate (Fleet Enema) 133 ml DAILY  PRN MA 


CONSTIPATION;  Start 7/16/19 at 12:00


Benzonatate (Tessalon) 100 mg TID PO  Last administered on 7/18/19at 12:28; 


Admin Dose 100 MG;  Start 7/16/19 at 13:00


Insulin Human NPH (Humulin N) 10 unit BID SC  Last administered on 7/17/19at 


20:41; Admin Dose 10 UNIT;  Start 7/16/19 at 21:00


Magnesium Hydroxide (Milk Of Mag) 30 ml DAILY  PRN PO CONSTIPATION Last 


administered on 7/18/19at 13:18; Admin Dose 30 ML;  Start 7/17/19 at 12:30


Lactulose (Enulose) 20 gm BID PO ;  Start 7/18/19 at 13:30;  Status WALLY BOOKER             Jul 18, 2019 13:35
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
## 2019-07-19 VITALS — HEART RATE: 63 BPM | SYSTOLIC BLOOD PRESSURE: 117 MMHG | DIASTOLIC BLOOD PRESSURE: 59 MMHG | RESPIRATION RATE: 16 BRPM

## 2019-07-19 VITALS — RESPIRATION RATE: 16 BRPM | HEART RATE: 77 BPM | SYSTOLIC BLOOD PRESSURE: 176 MMHG | DIASTOLIC BLOOD PRESSURE: 75 MMHG

## 2019-07-19 VITALS — HEART RATE: 74 BPM | DIASTOLIC BLOOD PRESSURE: 62 MMHG | SYSTOLIC BLOOD PRESSURE: 118 MMHG | RESPIRATION RATE: 18 BRPM

## 2019-07-19 VITALS — RESPIRATION RATE: 16 BRPM | SYSTOLIC BLOOD PRESSURE: 126 MMHG | HEART RATE: 85 BPM | DIASTOLIC BLOOD PRESSURE: 62 MMHG

## 2019-07-19 VITALS — DIASTOLIC BLOOD PRESSURE: 60 MMHG | HEART RATE: 73 BPM | SYSTOLIC BLOOD PRESSURE: 133 MMHG

## 2019-07-19 RX ADMIN — BENZONATATE SCH MG: 100 CAPSULE ORAL at 09:09

## 2019-07-19 RX ADMIN — DOCUSATE SODIUM SCH MG: 100 CAPSULE, LIQUID FILLED ORAL at 09:08

## 2019-07-19 RX ADMIN — BENZONATATE SCH MG: 100 CAPSULE ORAL at 12:18

## 2019-07-19 RX ADMIN — GABAPENTIN SCH MG: 300 CAPSULE ORAL at 09:09

## 2019-07-19 RX ADMIN — ISOSORBIDE DINITRATE SCH MG: 10 TABLET ORAL at 12:22

## 2019-07-19 RX ADMIN — LUBIPROSTONE SCH MCG: 24 CAPSULE, GELATIN COATED ORAL at 09:08

## 2019-07-19 RX ADMIN — ENOXAPARIN SODIUM SCH MG: 100 INJECTION SUBCUTANEOUS at 09:15

## 2019-07-19 RX ADMIN — GABAPENTIN SCH MG: 300 CAPSULE ORAL at 12:18

## 2019-07-19 RX ADMIN — CARISOPRODOL SCH MG: 350 TABLET ORAL at 09:09

## 2019-07-19 RX ADMIN — GUAIFENESIN AND DEXTROMETHORPHAN HYDROBROMIDE SCH TAB: 600; 30 TABLET, EXTENDED RELEASE ORAL at 09:09

## 2019-07-19 RX ADMIN — OXYBUTYNIN CHLORIDE SCH MG: 5 TABLET, EXTENDED RELEASE ORAL at 09:09

## 2019-07-19 RX ADMIN — INSULIN HUMAN SCH UNIT: 100 INJECTION, SUSPENSION SUBCUTANEOUS at 09:15

## 2019-07-19 RX ADMIN — ARFORMOTEROL TARTRATE SCH ML: 15 SOLUTION RESPIRATORY (INHALATION) at 09:17

## 2019-07-19 RX ADMIN — MORPHINE SULFATE PRN MG: 2 INJECTION, SOLUTION INTRAMUSCULAR; INTRAVENOUS at 16:01

## 2019-07-19 RX ADMIN — MORPHINE SULFATE PRN MG: 2 INJECTION, SOLUTION INTRAMUSCULAR; INTRAVENOUS at 12:16

## 2019-07-19 RX ADMIN — SENNOSIDES SCH TAB: 8.6 TABLET, FILM COATED ORAL at 09:00

## 2019-07-19 RX ADMIN — NICOTINE SCH PATCH: 14 PATCH, EXTENDED RELEASE TRANSDERMAL at 09:11

## 2019-07-19 RX ADMIN — AZITHROMYCIN SCH MG: 500 TABLET, FILM COATED ORAL at 09:09

## 2019-07-19 RX ADMIN — BACLOFEN SCH MG: 10 TABLET ORAL at 09:09

## 2019-07-19 RX ADMIN — BUDESONIDE SCH MG: 0.5 SUSPENSION RESPIRATORY (INHALATION) at 09:25

## 2019-07-19 RX ADMIN — ISOSORBIDE DINITRATE SCH MG: 10 TABLET ORAL at 09:10

## 2019-07-19 RX ADMIN — IPRATROPIUM BROMIDE AND ALBUTEROL SULFATE SCH ML: .5; 3 SOLUTION RESPIRATORY (INHALATION) at 09:05

## 2019-07-19 RX ADMIN — IPRATROPIUM BROMIDE AND ALBUTEROL SULFATE SCH ML: .5; 3 SOLUTION RESPIRATORY (INHALATION) at 00:31

## 2019-07-19 RX ADMIN — MORPHINE SULFATE PRN MG: 2 INJECTION, SOLUTION INTRAMUSCULAR; INTRAVENOUS at 02:32

## 2019-07-19 RX ADMIN — AMOXICILLIN AND CLAVULANATE POTASSIUM SCH MG: 875; 125 TABLET, FILM COATED ORAL at 09:09

## 2019-07-19 RX ADMIN — BUPROPION HYDROCHLORIDE SCH MG: 150 TABLET, EXTENDED RELEASE ORAL at 09:09

## 2019-07-19 RX ADMIN — IPRATROPIUM BROMIDE AND ALBUTEROL SULFATE SCH ML: .5; 3 SOLUTION RESPIRATORY (INHALATION) at 13:29

## 2019-07-19 RX ADMIN — IPRATROPIUM BROMIDE AND ALBUTEROL SULFATE SCH ML: .5; 3 SOLUTION RESPIRATORY (INHALATION) at 04:21

## 2019-07-19 RX ADMIN — LACTULOSE SCH GM: 10 SOLUTION ORAL at 09:00

## 2019-07-19 RX ADMIN — ALPRAZOLAM PRN MG: 0.25 TABLET ORAL at 10:36

## 2019-07-19 RX ADMIN — PANTOPRAZOLE SODIUM SCH MG: 40 TABLET, DELAYED RELEASE ORAL at 06:00

## 2019-07-19 RX ADMIN — IPRATROPIUM BROMIDE AND ALBUTEROL SULFATE SCH ML: .5; 3 SOLUTION RESPIRATORY (INHALATION) at 16:37

## 2019-07-19 NOTE — PDOCDIS
Discharge Instructions


CONDITION


                [id number]
Patient Condition:  [id number]
Stable








HOME CARE INSTRUCTIONS:


   [id number]
Your diet recommendation is:  [id number]
carbohydrate-controlled








FOLLOW UP/APPOINTMENTS


Follow-up Plan


Follow up with DR. Mancuso, Sae LUNA MD next week


Specialty 


PULMONARY Medicine 


Comments 


Office Address 


[address] [place], [place] [id number] 


Office Telephone 


[phone]





Follow up with primary care doctor in 1 week











LETICIA MOFFETT NP               Jul 19, 2019 12:46

## 2019-07-19 NOTE — DS
Date/Time of Note


Date/Time of Note


DATE: 7/19/19 


TIME: 13:02





Discharge Summary


Admission/Discharge Info


Admit Date/Time


Jul 12, 2019 at 19:53


Discharge Date/Time





Discharge Diagnosis


COPD exacerbation


Acute tracheobronchitis


DM2


HTN


Anxiety disorders


Overactive bladder


Tobacco abuse


Chronic Back pain


Obesity with a BMI 32.8


Constipation


Chronic abdominal wall hernia


Anxiety disorders


Patient Condition:  Stable


Consults





Procedures


7/13/2019 


Chest Xray





IMPRESSION:


 


No evidence for active cardiopulmonary disease.


Hospital Course


x64 yo w/copd,current every day smoker,dm neuropathy,anxiety, chronic back pain,


here w/cough/sob...





We continued patient on around-the-clock Annie, Brovana nebulization and IV 


steroids.  Patient is being followed by pulmonologist.  Patient requires 


supplemental oxygen and home health and home oxygen was approved.  Patient was 


also treated for acute tracheobronchitis with antibiotics with improvement in 


her symptoms.  She was continued on insulin for underlying diabetes.  Patient 


also needed anxiety medications.  She was also noted with constipation for which


she was being followed by gastroenterologist and responded with Amitiza.  At 


this time, patient is feeling back to her baseline.  She is stable from medical 


standpoint for discharge with outpatient pulmonary follow-up.  On discharge, 


breathing treatments has been changed from inhalation to nebulization and was 


given with prescriptions.  Patient already has a nebulizer at home.





Approximately 60-minute was spent on coordinating the discharge on this patient.





Patient was seen in collaboration with 


[facility]


Active Scripts


Famotidine* (Pepcid*) 20 Mg Tablet, 20 MG PO BID, #60 TAB


   Prov:LETICIA MOFFETT NP         7/19/19


Prednisone* (Prednisone*) 10 Mg Tab, 10 MG PO DAILY, #19 TAB


   take 4 pills day 1 and day 2


   Take 3 pills day 3 and day 4


   Take 2 pills day 5 and day 6


   Take 1 pill day 7 and day 8


   Prov:LETICIA MOFFETT NP         7/19/19


Sennosides* (Senna Lax*) 8.6 Mg Tablet, 2 TAB PO BID, #60 TAB


   Prov:MOFFETTLETICIA SWENSON NP         7/19/19


Lubiprostone* (Amitiza*) 24 Mcg Capsule, 24 MCG PO BID, #60 CAP


   Prov:LETICIA MOFFETT NP         7/19/19


Docusate Sodium* (Colace*) 100 Mg Capsule, 200 MG PO BID, #90 CAP


   Prov:LETICIA MOFFETT CHERYL NP         7/19/19


[Guaifenesin/Dm (Sr)] 1 TAB TABSR No Conflict Check, 1 TAB PO BID, #60 TAB


   Prov:CORETTA MOFFETTDYA PEREZ. NP         7/19/19


Budesonide* (Budesonide*) 0.5 Mg/2 Ml Ampul.neb, 0.5 MG INH BID RESP THERAPY, 


#60 DOSE


   Prov:CORETTA MOFFETTDYA V. NP         7/19/19


Benzonatate* (Benzonatate*) 100 Mg Capsule, 100 MG PO TID, #30 CAP


   Prov:MOFFETTCORETTALETICIAFLORIDA LUNA NP         7/19/19


Tiotropium Bromide* (Spiriva*) 18 Mcg Cap.w.dev, 1 INH INH DAILY, #1 INH


   Prov:MOFFETTCORETTALETICIAFLORIDA LUNA NP         7/19/19


Albuterol Sulfate* (Albuterol Sulfate* Neb) 0.083%-3 Ml Neb, 2.5 MG HHN Q4H RESP


THERAPY PRN for SHORTNESS OF BREATH, #100 DOSE


   take evry 4hrs for the next 1 week,then change to s needed


   Prov:CORETTA MOFFETTFLORIDA LUNA NP         7/19/19


Arformoterol Tartrate* (Brovana* Neb) 2 Ml Nebu, 2 ML INH BID RESP THERAPY, #60 


DOSE


   Prov:CORETTA MOFFETTDYA PEREZ. NP         7/19/19


Salmeterol Xinaf/Fluticasone* (Advair*) 250-50 Diskus Inhaler, 1 INH INH BID, #1


4 Refills


   you don not need to take this if approved for brovana and budoseide


   nebulization


   Prov:MOFFETT,LETICIAFLORIDA LUNA NP         7/19/19


Docusate Sodium* (Colace*) 100 Mg Capsule, 100 MG PO TID, #30 CAP


   Prov:CHIKIS CHANDLER S.         2/7/19


Isosorbide Dinitrate* (Isordil*) 10 Mg Tablet, 10 MG PO TID, #90 TAB 4 Refills


   Prov:PETAR MARIE S.         11/22/16


Polyethylene Glycol* (Miralax*) 17 Gm Powd.pack, 17 GM PO DAILY, #30


   Prov:DRE MARIEP S.         11/22/16


Phenazopyridine Hcl* (Phenazopyridine Hcl*) 100 Mg Tablet, 100 MG PO TID PRN for


dysuria, #60 TAB


   Prov:DRE MARIEP S.         11/22/16


Tramadol HCl (Tramadol HCl) 50 Mg Tablet, 100 MG PO Q6H, #120 TAB


   Prov:DOUGLAS MARIEEEP S.         11/22/16


Reported Medications


Oxycodone Hcl* (Oxycontin*) 40 Mg Tab.er.12h, 40 MG PO Q8, TAB


   7/13/19


Baclofen* (Baclofen*) 10 Mg Tablet, 10 MG PO DAILY, TAB


   7/13/19


Carisoprodol* (Carisoprodol*) 350 Mg Tablet, 350 MG PO Q12, TAB


   7/13/19


Gabapentin* (Neurontin*) 300 Mg Capsule, 300 MG PO TID for Neuropathy  MDD 900mg


, #90 CAP


   7/12/19


Temazepam* (Temazepam*) 15 Mg Capsule, 15 MG PO HS PRN for INSOMNIA, CAP


   7/12/19


Oxybutynin Chloride* (Ditropan* XL) 10 Mg Tab.er.24, 10 MG PO DAILY, TAB.SA


   11/11/16


Bupropion Hcl* (Wellbutrin SR*) 150 Mg Tablet.sa, 150 MG PO BID, TAB.SA


   11/11/16


Metformin Hcl (Metformin Hcl ER) 500 Mg Tab.er.24h, 500 MG PO DAILY, #30 TAB.SA


   11/11/16


Discontinued Reported Medications


Paroxetine Hcl* (Paxil*) 20 Mg Tablet, 20 MG PO DAILY, TAB


   11/11/16


Modafinil* (Modafinil*) 200 Mg Tablet, 200 MG PO DAILY, TAB


   11/11/16


Discontinued Scripts


Benzonatate* (Benzonatate*) 100 Mg Capsule, 100 MG PO TID PRN for cough, #90 CAP


   Prov:DRE MARIEP S.         11/22/16


Benazepril Hcl* (Benazepril Hcl*) 10 Mg Tablet, 10 MG PO BID, #60 TAB 4 Refills


   Prov:FRANKPETAR S.         11/22/16


Carvedilol* (Carvedilol*) 6.25 Mg Tablet, 6.25 MG PO BID, #60 TAB 4 Refills


   Prov:PETAR MARIE S.         11/22/16


Nicotine* (Nicotine* Patch) 7 mg/day Patch, 1 PATCH TRANSDERM DAILY, #30 2 


Refills


   Prov:PETAR MARIE S.         11/22/16


Tiotropium Bromide* (Spiriva*) 18 Mcg Cap.w.dev, 1 INH INH DAILY, #1 3 Refills


   Prov:PETAR MARIE S.         11/22/16


Follow-up Plan


Follow up with DR. Mancuso, Sea LUNA MD next week


Specialty 


PULMONARY Medicine 


Comments 


Office Address 


[address] [place] [address] 


Office Telephone 


[phone]





Follow up with primary care doctor in 1 week


Primary Care Provider


Not On Staff Doctor


Pending Labs





Laboratory Tests


Test
              7/18/19
17:16   7/18/19
20:18   7/19/19
07:58   7/19/19
12:04


Bedside                      107             123             117             126


Glucose
          mg/dL
()  mg/dL
()














LETICIA MOFFETT NP               Jul 19, 2019 13:02

## 2019-07-19 NOTE — CONS
DATE OF ADMISSION: 07/12/2019

DATE OF CONSULTATION:  07/19/2019

 

 

 

REASON FOR CONSULTATION:  Shortness of breath.

 

Thank you, Dr. Loza, for this consultation.

 

HISTORY OF PRESENT ILLNESS:  This is a 64-year-old lady with extensive ongoing tobacco history, admit
taylor with several-day history of increasing cough, shortness of breath, congestion, and hypoxemia, gerald
fitz on steroids, antibiotics, now with slowly improving bronchospasm.  She states she continues to sm
diana and has only successfully quit smoking once in the past.

 

PAST MEDICAL HISTORY:  As above.

 

MEDICATIONS:  Per chart.

 

ALLERGIES:  NONE.  

 

SOCIAL HISTORY:  Positive tobacco history.  Occasional alcohol, no history of drug abuse.

 

FAMILY HISTORY:  Noncontributory.

 

SYSTEMS REVIEW:  A 12-point review of systems was negative other than mentioned above.

 

PHYSICAL EXAMINATION:

GENERAL:  Well-nourished, well-developed lady, comfortable at rest, no acute distress.

VITAL SIGNS:  Currently afebrile, pulse is 80, blood pressure 126/62, O2 saturation 96% on 2 L nasal 
cannula.

NECK:  Supple.  No JVD.  

CARDIAC:  S1, S2, no added sounds or murmurs.

CHEST:  Diminished air entry bilaterally.

ABDOMEN:  Soft, nontender.  No guarding or rebound.

EXTREMITIES:  No clubbing, cyanosis or edema.

NEUROLOGIC:  Grossly intact.  No focal deficits.

 

LABORATORY DATA:  ABG on admission pO2 was 52.  White count is within normal limits.

 

IMPRESSION AND PLAN:

1.  Chronic obstructive pulmonary disease with acute exacerbation with hypoxemic respiratory failure.


2.  Ongoing tobacco use.

 

PLAN:

1.  Discharge home with steroid taper, antibiotics, bronchodilators, outpatient followup with me.

2.  Advised on smoking cessation.

 

 

Dictated By: REX CANTOR MD

 

SV/ANAHI

DD:    07/19/2019 15:14:27

DT:    07/19/2019 15:30:57

Conf#: [id number]

DID#:  [id number]

CC: NILSA LOZA MD;*EndCC*

## 2019-08-18 ENCOUNTER — HOSPITAL ENCOUNTER (EMERGENCY)
Dept: HOSPITAL 10 - FTE | Age: 64
LOS: 1 days | Discharge: HOME | End: 2019-08-19
Payer: MEDICARE

## 2019-08-18 VITALS
HEIGHT: 63 IN | BODY MASS INDEX: 27.17 KG/M2 | HEIGHT: 63 IN | BODY MASS INDEX: 27.17 KG/M2 | WEIGHT: 153.33 LBS | WEIGHT: 153.33 LBS
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
## 2019-08-18 DIAGNOSIS — S82.042A: Primary | ICD-10-CM

## 2019-08-18 DIAGNOSIS — S49.91XA: ICD-10-CM

## 2019-08-18 DIAGNOSIS — R51: ICD-10-CM

## 2019-08-18 DIAGNOSIS — S99.912A: ICD-10-CM

## 2019-08-18 DIAGNOSIS — F17.210: ICD-10-CM
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
## 2019-08-18 DIAGNOSIS — I10: ICD-10-CM

## 2019-08-18 DIAGNOSIS — S09.90XA: ICD-10-CM

## 2019-08-18 DIAGNOSIS — Z79.84: ICD-10-CM

## 2019-08-18 DIAGNOSIS — S99.911A: ICD-10-CM

## 2019-08-18 DIAGNOSIS — J44.9: ICD-10-CM

## 2019-08-18 PROCEDURE — 73030 X-RAY EXAM OF SHOULDER: CPT

## 2019-08-18 PROCEDURE — 70450 CT HEAD/BRAIN W/O DYE: CPT

## 2019-08-19 VITALS — HEART RATE: 90 BPM | RESPIRATION RATE: 22 BRPM | SYSTOLIC BLOOD PRESSURE: 142 MMHG | DIASTOLIC BLOOD PRESSURE: 69 MMHG
